# Patient Record
Sex: MALE | Race: WHITE | NOT HISPANIC OR LATINO | Employment: FULL TIME | URBAN - METROPOLITAN AREA
[De-identification: names, ages, dates, MRNs, and addresses within clinical notes are randomized per-mention and may not be internally consistent; named-entity substitution may affect disease eponyms.]

---

## 2017-01-09 ENCOUNTER — TELEPHONE (OUTPATIENT)
Dept: FAMILY MEDICINE | Facility: CLINIC | Age: 32
End: 2017-01-09

## 2017-01-23 ENCOUNTER — TELEPHONE (OUTPATIENT)
Dept: FAMILY MEDICINE | Facility: CLINIC | Age: 32
End: 2017-01-23

## 2017-01-26 ENCOUNTER — OFFICE VISIT (OUTPATIENT)
Dept: FAMILY MEDICINE | Facility: CLINIC | Age: 32
End: 2017-01-26
Payer: COMMERCIAL

## 2017-01-26 ENCOUNTER — DOCUMENTATION ONLY (OUTPATIENT)
Dept: FAMILY MEDICINE | Facility: CLINIC | Age: 32
End: 2017-01-26

## 2017-01-26 VITALS
RESPIRATION RATE: 16 BRPM | HEART RATE: 86 BPM | TEMPERATURE: 98 F | OXYGEN SATURATION: 100 % | DIASTOLIC BLOOD PRESSURE: 75 MMHG | SYSTOLIC BLOOD PRESSURE: 133 MMHG | WEIGHT: 213.88 LBS | BODY MASS INDEX: 29.94 KG/M2 | HEIGHT: 71 IN

## 2017-01-26 DIAGNOSIS — F11.20 NARCOTIC DEPENDENCE: ICD-10-CM

## 2017-01-26 PROCEDURE — 1159F MED LIST DOCD IN RCRD: CPT | Mod: S$GLB,,, | Performed by: INTERNAL MEDICINE

## 2017-01-26 PROCEDURE — 80305 DRUG TEST PRSMV DIR OPT OBS: CPT | Mod: S$GLB,,, | Performed by: INTERNAL MEDICINE

## 2017-01-26 PROCEDURE — 99213 OFFICE O/P EST LOW 20 MIN: CPT | Mod: S$GLB,,, | Performed by: INTERNAL MEDICINE

## 2017-01-26 RX ORDER — BUPRENORPHINE HYDROCHLORIDE AND NALOXONE HYDROCHLORIDE DIHYDRATE 8; 2 MG/1; MG/1
8 TABLET SUBLINGUAL 2 TIMES DAILY
Qty: 60 TABLET | Refills: 0 | Status: SHIPPED | OUTPATIENT
Start: 2017-01-26 | End: 2017-01-26 | Stop reason: SDUPTHER

## 2017-01-26 RX ORDER — BUPRENORPHINE HYDROCHLORIDE AND NALOXONE HYDROCHLORIDE DIHYDRATE 8; 2 MG/1; MG/1
8 TABLET SUBLINGUAL 2 TIMES DAILY
Qty: 60 TABLET | Refills: 0 | Status: SHIPPED | OUTPATIENT
Start: 2017-01-26 | End: 2017-03-07 | Stop reason: SDUPTHER

## 2017-01-26 NOTE — MR AVS SNAPSHOT
[facility]  [address] [address] [address]  Phone: [phone]  Fax: [phone]                  Vargas Lozano   2017 11:20 AM   Office Visit    Description:  Male : 1985   Provider:  Abdulaziz Dolan MD   Department:  [facility]           Reason for Visit     opioid dependence           Diagnoses this Visit        Comments    Narcotic dependence                To Do List           Future Appointments        Provider Department Dept Phone    2017 4:20 PM Abdulaziz Dolan MD [facility] [phone]      Goals (5 Years of Data)     None      Follow-Up and Disposition     Return in about 1 month (around 2017).    Follow-up and Disposition History       These Medications        Disp Refills Start End    buprenorphine-naloxone 8-2 mg (SUBOXONE) 8-2 mg Subl 60 tablet 0 2017     Place 1 tablet under the tongue 2 (two) times daily. May substitue films for tablets. - Sublingual    Pharmacy: [facility] [address] [place] [address] [place], [place] - [address] AT [facility] 43 Ph #: [phone]         [facility] On Call     [facility] Nurse Care Line -  Assistance  Registered nurses in the [facility] provide clinical advisement, health education, appointment booking, and other advisory services.  Call for this free service at [phone].             Medications           Message regarding Medications     Verify the changes and/or additions to your medication regime listed below are the same as discussed with your clinician today.  If any of these changes or additions are incorrect, please notify your healthcare provider.             Verify that the below list of medications is an accurate representation of the medications you are currently taking.  If none reported, the list may be blank. If incorrect, please contact your healthcare provider. Carry this list with you in case  "of emergency.           Current Medications     buprenorphine-naloxone 8-2 mg (SUBOXONE) 8-2 mg Subl Place 1 tablet under the tongue 2 (two) times daily. May substitue films for tablets.    fluoxetine (PROZAC) 20 MG tablet Take 1 tablet (20 mg total) by mouth once daily.    senna (SENOKOT) 8.6 mg tablet Take 1 tablet by mouth once daily.           Clinical Reference Information           Vital Signs - Last Recorded  Most recent update: 1/26/2017 11:36 AM by Susan Greene MA    BP Pulse Temp Resp Ht Wt    133/75 (BP Location: Left arm, Patient Position: Sitting, BP Method: Automatic) 86 98.4 °F (36.9 °C) (Oral) 16 5' 11" (1.803 m) 97 kg (213 lb 13.5 oz)    SpO2 BMI             100% 29.83 kg/m2         Blood Pressure          Most Recent Value    BP  133/75      Allergies as of 1/26/2017     No Known Allergies      Immunizations Administered on Date of Encounter - 1/26/2017     None      Instructions     An order for a urine drug screen with suboxone was given.  The patient is to use the order when called, on a random day.          Smoking Cessation     If you would like to quit smoking:   You may be eligible for free services if you are a [place] resident and started smoking cigarettes before September 1, 1988.  Call the Smoking Cessation Trust ([employer]) toll free at ([phone] or [phone] [phone].   Call 8-059-QUIT-NOW if you do not meet the above criteria.            "

## 2017-01-26 NOTE — PROGRESS NOTES
Health Maintenance Due   Topic Date Due    Lipid Panel  1985    TETANUS VACCINE  11/16/2001

## 2017-01-26 NOTE — PATIENT INSTRUCTIONS
An order for a urine drug screen with suboxone was given.  The patient is to use the order when called, on a random day.

## 2017-01-26 NOTE — PROGRESS NOTES
"Subjective:       Patient ID: Vargas Lozano is a 31 y.o. male.    Chief Complaint: opioid dependence    HPI     The patient presents for medical management of opioid dependency. he is receiving maintenance therapy with buprenorphine.      CHIEF COMPLAINT: opoid dependence  HPI:     ONSET/TIMING:     DURATION: . Continuous(+).    QUALITY/COURSE:  unchanged.     INTENSITY/SEVERITY:  controlled.    CONTEXT/WHEN:     MODIFIERS/TREATMENTS:  Taking medications(+) Suboxone  8/2 # 60,   last filled 12.27.16. . .    SYMPTOMS/RELATED: no withdrawal symptoms. no cravings . No substance abuse.  No alcohol use     pnp checked: last month:  yes    Review of Systems   Constitutional: Negative for fatigue and unexpected weight change.   Cardiovascular: Negative for leg swelling.   Gastrointestinal: Negative for constipation, diarrhea and nausea.   Musculoskeletal: Negative for gait problem.   Neurological: Negative for dizziness, tremors and headaches.   Psychiatric/Behavioral: Negative for behavioral problems, dysphoric mood and sleep disturbance.       Objective:      Vitals:    01/26/17 1133   BP: 133/75   Pulse: 86   Resp: 16   Temp: 98.4 °F (36.9 °C)   TempSrc: Oral   SpO2: 100%   Weight: 97 kg (213 lb 13.5 oz)   Height: 5' 11" (1.803 m)   PainSc: 0-No pain     Physical Exam   Constitutional: He appears well-developed and well-nourished.   Eyes: Pupils are equal, round, and reactive to light.   Cardiovascular: Normal rate, regular rhythm and normal heart sounds.    Pulmonary/Chest: Effort normal and breath sounds normal.   Abdominal: Soft. There is no tenderness.   Neurological: He is alert.   Psychiatric: He has a normal mood and affect. His behavior is normal. Thought content normal.   Nursing note and vitals reviewed.  Urine drug screen negative except buprenorphine.         Assessment:       1. Narcotic dependence          Plan:   (+) pt taking medication as prescribed.    (+) dose is approproate.    (+) urine drug " screen done.   (+) discussed risks of buprenorphine, including to others.     (+) assessed if benefits outweigh risks of buprenorphine. .     (+) reviewed safe storage of medication.     (+) discussed proper use of buprenorphine, including missed doses.    Narcotic dependence  -     buprenorphine-naloxone 8-2 mg (SUBOXONE) 8-2 mg Subl; Place 1 tablet under the tongue 2 (two) times daily. May substitue films for tablets.  Dispense: 60 tablet; Refill: 0    Return in about 1 month (around 2/26/2017).

## 2017-02-01 ENCOUNTER — TELEPHONE (OUTPATIENT)
Dept: FAMILY MEDICINE | Facility: CLINIC | Age: 32
End: 2017-02-01

## 2017-02-01 DIAGNOSIS — F11.20 NARCOTIC DEPENDENCE: Primary | ICD-10-CM

## 2017-02-08 ENCOUNTER — TELEPHONE (OUTPATIENT)
Dept: FAMILY MEDICINE | Facility: CLINIC | Age: 32
End: 2017-02-08

## 2017-02-20 LAB
AMP D-AMPHETAMINE 1000 NG/ML: NEGATIVE
BAR SECOBARBITAL 300 NG/ML: NEGATIVE
BUP BUPRENORPHINE 10 NG/ML: POSITIVE
BZO OXAZEPAM 300 NG/ML: NEGATIVE
COC BENZOYLECGONINE 300 NG/ML: NEGATIVE
CTP QC/QA: YES
MET D-METHAMPHETAMINE 500 NG/ML: NEGATIVE
MOP MORPHINE 300 NG/ML: NEGATIVE
MTD METHADONE 300 NG/ML: NEGATIVE
QXY OXYCODONE 100 NG/ML: NEGATIVE
THC 11-NOR-9-TETRAHYDROCANNABINOL-9-CARBOXYLIC ACID: NEGATIVE

## 2017-02-22 ENCOUNTER — TELEPHONE (OUTPATIENT)
Dept: FAMILY MEDICINE | Facility: CLINIC | Age: 32
End: 2017-02-22

## 2017-02-23 ENCOUNTER — DOCUMENTATION ONLY (OUTPATIENT)
Dept: FAMILY MEDICINE | Facility: CLINIC | Age: 32
End: 2017-02-23

## 2017-02-23 NOTE — PROGRESS NOTES
Health Maintenance Due   Topic Date Due    Lipid Panel  1985    TETANUS VACCINE  11/16/2001    Pneumococcal PPSV23 (Medium Risk) (1) 11/16/2001    Influenza Vaccine  08/01/2016

## 2017-03-07 ENCOUNTER — OFFICE VISIT (OUTPATIENT)
Dept: FAMILY MEDICINE | Facility: CLINIC | Age: 32
End: 2017-03-07
Payer: COMMERCIAL

## 2017-03-07 ENCOUNTER — CLINICAL SUPPORT (OUTPATIENT)
Dept: FAMILY MEDICINE | Facility: CLINIC | Age: 32
End: 2017-03-07
Payer: COMMERCIAL

## 2017-03-07 ENCOUNTER — DOCUMENTATION ONLY (OUTPATIENT)
Dept: FAMILY MEDICINE | Facility: CLINIC | Age: 32
End: 2017-03-07

## 2017-03-07 ENCOUNTER — TELEPHONE (OUTPATIENT)
Dept: FAMILY MEDICINE | Facility: CLINIC | Age: 32
End: 2017-03-07

## 2017-03-07 VITALS
BODY MASS INDEX: 30.25 KG/M2 | SYSTOLIC BLOOD PRESSURE: 130 MMHG | TEMPERATURE: 98 F | HEIGHT: 71 IN | HEART RATE: 78 BPM | OXYGEN SATURATION: 97 % | WEIGHT: 216.06 LBS | DIASTOLIC BLOOD PRESSURE: 83 MMHG | RESPIRATION RATE: 16 BRPM

## 2017-03-07 DIAGNOSIS — F11.20 OPIOID TYPE DEPENDENCE, UNSPECIFIED: Primary | ICD-10-CM

## 2017-03-07 DIAGNOSIS — F11.20 NARCOTIC DEPENDENCE: ICD-10-CM

## 2017-03-07 PROCEDURE — 80305 DRUG TEST PRSMV DIR OPT OBS: CPT | Mod: S$GLB,,, | Performed by: INTERNAL MEDICINE

## 2017-03-07 PROCEDURE — 1160F RVW MEDS BY RX/DR IN RCRD: CPT | Mod: S$GLB,,, | Performed by: INTERNAL MEDICINE

## 2017-03-07 PROCEDURE — 99213 OFFICE O/P EST LOW 20 MIN: CPT | Mod: S$GLB,,, | Performed by: INTERNAL MEDICINE

## 2017-03-07 RX ORDER — BUPRENORPHINE HYDROCHLORIDE AND NALOXONE HYDROCHLORIDE DIHYDRATE 8; 2 MG/1; MG/1
8 TABLET SUBLINGUAL 2 TIMES DAILY
Qty: 60 TABLET | Refills: 0 | Status: SHIPPED | OUTPATIENT
Start: 2017-03-07 | End: 2017-04-20 | Stop reason: SDUPTHER

## 2017-03-07 NOTE — MR AVS SNAPSHOT
[facility]  [address] [address] [address]  Phone: [phone]  Fax: [phone]                  Vargas Lozano   3/7/2017 10:00 AM   Office Visit    Description:  Male : 1985   Provider:  Abdulaziz Dolan MD   Department:  [facility]           Reason for Visit     opioid dependence           Diagnoses this Visit        Comments    Narcotic dependence                To Do List           Future Appointments        Provider Department Dept Phone    2017 10:40 AM Abdulaziz Dolan MD [facility] [phone]      Goals (5 Years of Data)     None      Follow-Up and Disposition     Return in about 1 month (around 2017).    Follow-up and Disposition History       These Medications        Disp Refills Start End    buprenorphine-naloxone 8-2 mg (SUBOXONE) 8-2 mg Subl 60 tablet 0 3/7/2017     Place 1 tablet under the tongue 2 (two) times daily. May substitue films for tablets. - Sublingual    [facility]acy: [facility] [address] [place] [address] [place], [place] - [address] AT [facility] 43 Ph #: [phone]         [facility] On Call     [facility] Nurse Care Line -  Assistance  Registered nurses in the [facility] provide clinical advisement, health education, appointment booking, and other advisory services.  Call for this free service at [phone].             Medications           Message regarding Medications     Verify the changes and/or additions to your medication regime listed below are the same as discussed with your clinician today.  If any of these changes or additions are incorrect, please notify your healthcare provider.             Verify that the below list of medications is an accurate representation of the medications you are currently taking.  If none reported, the list may be blank. If incorrect, please contact your healthcare provider. Carry this list with you in case of  "emergency.           Current Medications     buprenorphine-naloxone 8-2 mg (SUBOXONE) 8-2 mg Subl Place 1 tablet under the tongue 2 (two) times daily. May substitue films for tablets.    senna (SENOKOT) 8.6 mg tablet Take 1 tablet by mouth once daily.    fluoxetine (PROZAC) 20 MG tablet Take 1 tablet (20 mg total) by mouth once daily.           Clinical Reference Information           Your Vitals Were     BP Pulse Temp Resp Height Weight    130/83 (BP Location: Right arm, Patient Position: Sitting, BP Method: Automatic) 78 98.2 °F (36.8 °C) (Oral) 16 5' 11" (1.803 m) 98 kg (216 lb 0.8 oz)    SpO2 BMI             97% 30.13 kg/m2         Blood Pressure          Most Recent Value    BP  130/83      Allergies as of 3/7/2017     No Known Allergies      Immunizations Administered on Date of Encounter - 3/7/2017     None      Instructions     An order for a urine drug screen with suboxone was given.  The patient is to use the order when called, on a random day.          Smoking Cessation     If you would like to quit smoking:   You may be eligible for free services if you are a [place] resident and started smoking cigarettes before September 1, 1988.  Call the Smoking Cessation Trust ([facility]) toll free at [phone] or [phone].   Call 0-800-QUIT-NOW if you do not meet the above criteria.            Language Assistance Services     ATTENTION: Language assistance services are available, free of charge. Please call [phone].      ATENCIÓN: Si habla español, tiene a somers disposición servicios gratuitos de asistencia lingüística. Llame al [phone].     [facility] Ý: N?u b?n nói Ti?ng Vi?t, có các d?ch v? h? tr? ngôn ng? mi?n phí dành cho b?n. G?i s? [phone].         [facility] complies with applicable Federal civil rights laws and does not discriminate on the basis of race, color, national origin, age, disability, or sex.        "

## 2017-03-07 NOTE — TELEPHONE ENCOUNTER
----- Message from Apryl Gaytan sent at 3/7/2017  8:24 AM CST -----  Contact: Patient  Patient called to schedule appointment for suboxone . Please call back at [phone] to confirm thanks,

## 2017-03-07 NOTE — PROGRESS NOTES
"Subjective:       Patient ID: Vargas Lozano is a 31 y.o. male.    Chief Complaint: opioid dependence    HPI   The patient presents for medical management of opioid dependency. he is receiving maintenance therapy with buprenorphine.      CHIEF COMPLAINT: opoid dependence  HPI:     ONSET/TIMING:     DURATION: . Continuous(+).    QUALITY/COURSE:  unchanged.     INTENSITY/SEVERITY:  controlled.    CONTEXT/WHEN:     MODIFIERS/TREATMENTS:  Taking medications(+) Suboxone  8/2 # 60,   last filled 1.26.17. . .    SYMPTOMS/RELATED: no withdrawal symptoms. no cravings . No substance abuse.  No alcohol use     pnp checked: last month:  yes    Review of Systems   Constitutional: Negative for fatigue and unexpected weight change.   Cardiovascular: Negative for leg swelling.   Gastrointestinal: Negative for constipation, diarrhea and nausea.   Musculoskeletal: Negative for gait problem.   Neurological: Negative for dizziness, tremors and headaches.   Psychiatric/Behavioral: Negative for behavioral problems, dysphoric mood and sleep disturbance.       Objective:      Vitals:    03/07/17 0955   BP: 130/83   Pulse: 78   Resp: 16   Temp: 98.2 °F (36.8 °C)   TempSrc: Oral   SpO2: 97%   Weight: 98 kg (216 lb 0.8 oz)   Height: 5' 11" (1.803 m)   PainSc: 0-No pain     Physical Exam   Constitutional: He appears well-developed and well-nourished.   Eyes: Pupils are equal, round, and reactive to light.   Cardiovascular: Normal rate, regular rhythm and normal heart sounds.    Pulmonary/Chest: Effort normal and breath sounds normal.   Abdominal: Soft. There is no tenderness.   Neurological: He is alert.   Psychiatric: He has a normal mood and affect. His behavior is normal. Thought content normal.   Nursing note and vitals reviewed.  Urine drug screen negative except buprenorphine.         Assessment:       1. Narcotic dependence          Plan:   (+) pt taking medication as prescribed.    (+) dose is approproate.    (+) urine drug " screen done.   (+) discussed risks of buprenorphine, including to others.     (+) assessed if benefits outweigh risks of buprenorphine. .     (+) reviewed safe storage of medication.     (+) discussed proper use of buprenorphine, including missed doses.    Narcotic dependence  -     buprenorphine-naloxone 8-2 mg (SUBOXONE) 8-2 mg Subl; Place 1 tablet under the tongue 2 (two) times daily. May substitue films for tablets.  Dispense: 60 tablet; Refill: 0    No Follow-up on file.

## 2017-04-10 ENCOUNTER — TELEPHONE (OUTPATIENT)
Dept: FAMILY MEDICINE | Facility: CLINIC | Age: 32
End: 2017-04-10

## 2017-04-20 ENCOUNTER — DOCUMENTATION ONLY (OUTPATIENT)
Dept: FAMILY MEDICINE | Facility: CLINIC | Age: 32
End: 2017-04-20

## 2017-04-20 ENCOUNTER — OFFICE VISIT (OUTPATIENT)
Dept: FAMILY MEDICINE | Facility: CLINIC | Age: 32
End: 2017-04-20
Payer: COMMERCIAL

## 2017-04-20 VITALS
WEIGHT: 215.63 LBS | BODY MASS INDEX: 30.19 KG/M2 | HEIGHT: 71 IN | DIASTOLIC BLOOD PRESSURE: 86 MMHG | RESPIRATION RATE: 16 BRPM | SYSTOLIC BLOOD PRESSURE: 126 MMHG | OXYGEN SATURATION: 99 % | TEMPERATURE: 98 F | HEART RATE: 76 BPM

## 2017-04-20 DIAGNOSIS — R61 NIGHT SWEATS: ICD-10-CM

## 2017-04-20 DIAGNOSIS — F11.20 NARCOTIC DEPENDENCE: Primary | ICD-10-CM

## 2017-04-20 PROCEDURE — 1160F RVW MEDS BY RX/DR IN RCRD: CPT | Mod: S$GLB,,, | Performed by: INTERNAL MEDICINE

## 2017-04-20 PROCEDURE — 99213 OFFICE O/P EST LOW 20 MIN: CPT | Mod: S$GLB,,, | Performed by: INTERNAL MEDICINE

## 2017-04-20 RX ORDER — BUPRENORPHINE HYDROCHLORIDE AND NALOXONE HYDROCHLORIDE DIHYDRATE 8; 2 MG/1; MG/1
8 TABLET SUBLINGUAL 2 TIMES DAILY
Qty: 60 TABLET | Refills: 0 | Status: SHIPPED | OUTPATIENT
Start: 2017-04-20 | End: 2017-05-24 | Stop reason: SDUPTHER

## 2017-04-20 NOTE — PROGRESS NOTES
Health Maintenance Due   Topic Date Due    Lipid Panel  1985    Pneumococcal PPSV23 (Medium Risk) (1) 11/16/2001    Influenza Vaccine  08/01/2016

## 2017-04-20 NOTE — MR AVS SNAPSHOT
[facility]  [address] [address] [address]  Phone: [phone]  Fax: [phone]                  Vargas Lozano   2017 10:20 AM   Office Visit    Description:  Male : 1985   Provider:  Abdulaziz Dolan MD   Department:  [facility]           Reason for Visit     opioid dependence           Diagnoses this Visit        Comments    Narcotic dependence    -  Primary     Night sweats                To Do List           Future Appointments        Provider Department Dept Phone    2017 9:00 AM [facility] XR1 [facility]- X-Ray [phone]    2017 11:20 AM Abdulaziz Dolan MD [facility] [phone]      Goals (5 Years of Data)     None      Follow-Up and Disposition     Return in about 1 month (around 2017).    Follow-up and Disposition History       These Medications        Disp Refills Start End    buprenorphine-naloxone 8-2 mg (SUBOXONE) 8-2 mg Subl 60 tablet 0 2017     Place 1 tablet under the tongue 2 (two) times daily. May substitue films for tablets. - Sublingual    Pharmacy: [facility] [address] [place], [place] [address] AT [facility] 43 Ph #: [phone]         [facility]     [facility] - 24 Assistance  Unless otherwise directed by your provider, please contact [facility], our nurse care line that is available for  assistance.     Registered nurses in the [facility] provide: appointment scheduling, clinical advisement, health education, and other advisory services.  Call: [phone] (toll free)               Medications           Message regarding Medications     Verify the changes and/or additions to your medication regime listed below are the same as discussed with your clinician today.  If any of these changes or additions are incorrect, please notify your healthcare provider.             Verify that the  "below list of medications is an accurate representation of the medications you are currently taking.  If none reported, the list may be blank. If incorrect, please contact your healthcare provider. Carry this list with you in case of emergency.           Current Medications     buprenorphine-naloxone 8-2 mg (SUBOXONE) 8-2 mg Subl Place 1 tablet under the tongue 2 (two) times daily. May substitue films for tablets.    fluoxetine (PROZAC) 20 MG tablet Take 1 tablet (20 mg total) by mouth once daily.    senna (SENOKOT) 8.6 mg tablet Take 1 tablet by mouth once daily.           Clinical Reference Information           Your Vitals Were     BP Pulse Temp Resp Height Weight    126/86 (BP Location: Left arm, Patient Position: Sitting, BP Method: Automatic) 76 98 °F (36.7 °C) (Oral) 16 5' 11" (1.803 m) 97.8 kg (215 lb 9.8 oz)    SpO2 BMI             99% 30.07 kg/m2         Blood Pressure          Most Recent Value    BP  126/86      Allergies as of 4/20/2017     No Known Allergies      Immunizations Administered on Date of Encounter - 4/20/2017     None      Orders Placed During Today's Visit     Future Labs/Procedures Expected by Expires    X-Ray Chest PA And Lateral  4/20/2017 7/19/2017      Instructions     An order for a urine drug screen with suboxone was given.  The patient is to use the order when called, on a random day.          Smoking Cessation     If you would like to quit smoking:   You may be eligible for free services if you are a [place] resident and started smoking cigarettes before September 1, 1988.  Call the Smoking Cessation Trust ([facility]) toll free at ([phone] or [phone] [phone].   Call 2-800-QUIT-NOW if you do not meet the above criteria.   Contact us via email: [email or web address]   View our website for more information: [email or web address]        Language Assistance Services     ATTENTION: Language assistance services are available, free of charge. Please call [phone].  "     ATENCIÓN: Si habla español, tiene a somers disposición servicios gratuitos de asistencia lingüística. Remi al [phone].     CHÚ Ý: N?u b?n nói Ti?ng Vi?t, có các d?ch v? h? tr? ngôn ng? mi?n phí dành cho b?n. G?i s? [phone].         [facility] complies with applicable Federal civil rights laws and does not discriminate on the basis of race, color, national origin, age, disability, or sex.

## 2017-04-20 NOTE — PROGRESS NOTES
"Subjective:       Patient ID: Vargas Lozano is a 31 y.o. male.    Chief Complaint: opioid dependence    HPI   The patient presents for medical management of opioid dependency. he is receiving maintenance therapy with buprenorphine.      CHIEF COMPLAINT: opoid dependence  HPI:     ONSET/TIMING:     DURATION: . Continuous(+).    QUALITY/COURSE:  unchanged.     INTENSITY/SEVERITY:  controlled.    CONTEXT/WHEN:     MODIFIERS/TREATMENTS:  Taking medications(+) Suboxone  8/2 # 60,   last filled 3.7.17. . .    SYMPTOMS/RELATED: no withdrawal symptoms. no cravings . No substance abuse.  No alcohol use     pnp checked: last month:  Yes    Hx positive tb test.  Was treated with INH,  But having night sweats.     Review of Systems   Constitutional: Positive for diaphoresis. Negative for fatigue and unexpected weight change.   Cardiovascular: Negative for leg swelling.   Gastrointestinal: Negative for constipation, diarrhea and nausea.   Musculoskeletal: Negative for gait problem.   Neurological: Negative for dizziness, tremors and headaches.   Psychiatric/Behavioral: Negative for behavioral problems, dysphoric mood and sleep disturbance.       Objective:      Vitals:    04/20/17 1024   BP: 126/86   Pulse: 76   Resp: 16   Temp: 98 °F (36.7 °C)   TempSrc: Oral   SpO2: 99%   Weight: 97.8 kg (215 lb 9.8 oz)   Height: 5' 11" (1.803 m)   PainSc: 0-No pain     Physical Exam   Constitutional: He appears well-developed and well-nourished.   Eyes: Pupils are equal, round, and reactive to light.   Cardiovascular: Normal rate, regular rhythm and normal heart sounds.    Pulmonary/Chest: Effort normal and breath sounds normal.   Abdominal: Soft. There is no tenderness.   Neurological: He is alert.   Psychiatric: He has a normal mood and affect. His behavior is normal. Thought content normal.   Nursing note and vitals reviewed.      Urine drug screen negative except buprenorphine.    Assessment:       1. Narcotic dependence    2. " Night sweats          Plan:   (+) pt taking medication as prescribed.    (+) dose is approproate.    (+) urine drug screen done.   (+) discussed risks of buprenorphine, including to others.     (+) assessed if benefits outweigh risks of buprenorphine. .     (+) reviewed safe storage of medication.     (+) discussed proper use of buprenorphine, including missed doses.    Narcotic dependence  -     buprenorphine-naloxone 8-2 mg (SUBOXONE) 8-2 mg Subl; Place 1 tablet under the tongue 2 (two) times daily. May substitue films for tablets.  Dispense: 60 tablet; Refill: 0    Night sweats  -     X-Ray Chest PA And Lateral; Future; Expected date: 4/20/17      Return in about 1 month (around 5/20/2017).

## 2017-04-26 ENCOUNTER — TELEPHONE (OUTPATIENT)
Dept: FAMILY MEDICINE | Facility: CLINIC | Age: 32
End: 2017-04-26

## 2017-05-18 ENCOUNTER — DOCUMENTATION ONLY (OUTPATIENT)
Dept: FAMILY MEDICINE | Facility: CLINIC | Age: 32
End: 2017-05-18

## 2017-05-18 NOTE — PROGRESS NOTES
Health Maintenance Due   Topic Date Due    Lipid Panel  1985    Pneumococcal PPSV23 (Medium Risk) (1) 11/16/2001

## 2017-05-22 ENCOUNTER — TELEPHONE (OUTPATIENT)
Dept: FAMILY MEDICINE | Facility: CLINIC | Age: 32
End: 2017-05-22

## 2017-05-24 ENCOUNTER — CLINICAL SUPPORT (OUTPATIENT)
Dept: FAMILY MEDICINE | Facility: CLINIC | Age: 32
End: 2017-05-24
Payer: COMMERCIAL

## 2017-05-24 ENCOUNTER — DOCUMENTATION ONLY (OUTPATIENT)
Dept: FAMILY MEDICINE | Facility: CLINIC | Age: 32
End: 2017-05-24

## 2017-05-24 ENCOUNTER — OFFICE VISIT (OUTPATIENT)
Dept: FAMILY MEDICINE | Facility: CLINIC | Age: 32
End: 2017-05-24
Payer: COMMERCIAL

## 2017-05-24 VITALS
BODY MASS INDEX: 29.85 KG/M2 | WEIGHT: 213.19 LBS | HEIGHT: 71 IN | HEART RATE: 85 BPM | DIASTOLIC BLOOD PRESSURE: 89 MMHG | SYSTOLIC BLOOD PRESSURE: 132 MMHG | TEMPERATURE: 98 F | RESPIRATION RATE: 16 BRPM | OXYGEN SATURATION: 97 %

## 2017-05-24 DIAGNOSIS — F10.10 ALCOHOL ABUSE: Primary | ICD-10-CM

## 2017-05-24 DIAGNOSIS — B35.4 TINEA CORPORIS: ICD-10-CM

## 2017-05-24 DIAGNOSIS — F11.20 NARCOTIC DEPENDENCE: ICD-10-CM

## 2017-05-24 DIAGNOSIS — F11.20 OPIOID TYPE DEPENDENCE, CONTINUOUS: Primary | ICD-10-CM

## 2017-05-24 PROCEDURE — 99213 OFFICE O/P EST LOW 20 MIN: CPT | Mod: S$GLB,,, | Performed by: INTERNAL MEDICINE

## 2017-05-24 PROCEDURE — 1160F RVW MEDS BY RX/DR IN RCRD: CPT | Mod: S$GLB,,, | Performed by: INTERNAL MEDICINE

## 2017-05-24 PROCEDURE — 80305 DRUG TEST PRSMV DIR OPT OBS: CPT | Mod: QW,S$GLB,, | Performed by: INTERNAL MEDICINE

## 2017-05-24 RX ORDER — BUPRENORPHINE HYDROCHLORIDE AND NALOXONE HYDROCHLORIDE DIHYDRATE 8; 2 MG/1; MG/1
1 TABLET SUBLINGUAL 2 TIMES DAILY
Qty: 60 TABLET | Refills: 0 | Status: SHIPPED | OUTPATIENT
Start: 2017-05-24 | End: 2017-08-07 | Stop reason: SDUPTHER

## 2017-05-24 RX ORDER — FLUCONAZOLE 100 MG/1
100 TABLET ORAL DAILY
Qty: 30 TABLET | Refills: 0 | Status: SHIPPED | OUTPATIENT
Start: 2017-05-24 | End: 2017-06-23

## 2017-05-24 NOTE — PROGRESS NOTES
"Subjective:       Patient ID: Vargas Lozano is a 31 y.o. male.    Chief Complaint: opioid dependence    HPI   The patient presents for medical management of opioid dependency. he is receiving maintenance therapy with buprenorphine.      CHIEF COMPLAINT: opoid dependence  HPI:     ONSET/TIMING:     DURATION: . Continuous(+).    QUALITY/COURSE:  unchanged.     INTENSITY/SEVERITY:  controlled.    CONTEXT/WHEN:     MODIFIERS/TREATMENTS:  Taking medications(+) Suboxone  8/2 # 60,   last filled 4.20.17. . .    SYMPTOMS/RELATED: no withdrawal symptoms. no cravings . No substance abuse.   alcohol use    The patient is drinking between 4 and 6 beers a day.  Sometimes when the weekends.  His wife is here is concerned about his alcohol use.  He currently drives  after drinking.     pnp checked: last month:  Yes    Patient has a slightly itchy rash on his chest and back.  Worse in the sun.  For 6 months    Review of Systems   Constitutional: Negative for fatigue and unexpected weight change.   Cardiovascular: Negative for leg swelling.   Gastrointestinal: Negative for constipation, diarrhea and nausea.   Musculoskeletal: Negative for gait problem.   Neurological: Positive for headaches. Negative for dizziness and tremors.   Psychiatric/Behavioral: Negative for behavioral problems, dysphoric mood and sleep disturbance.       Objective:      Vitals:    05/24/17 1044   BP: 132/89   Pulse: 85   Resp: 16   Temp: 98.3 °F (36.8 °C)   TempSrc: Oral   SpO2: 97%   Weight: 96.7 kg (213 lb 3 oz)   Height: 5' 11" (1.803 m)   PainSc: 0-No pain     Physical Exam   Constitutional: He appears well-developed and well-nourished.   Eyes: Pupils are equal, round, and reactive to light.   Cardiovascular: Normal rate, regular rhythm and normal heart sounds.    Pulmonary/Chest: Effort normal and breath sounds normal.   Abdominal: Soft. There is no tenderness.   Neurological: He is alert.   Skin:   Macular rash largest lesion being 12 x 5 cm on " his back.  All with accentuated borders   Psychiatric: He has a normal mood and affect. His behavior is normal. Thought content normal.   Nursing note and vitals reviewed.      Urine drug screen negative except buprenorphine.     Assessment:       1. Alcohol abuse    2. Narcotic dependence    3. Tinea corporis          Plan:     Alcohol abuse  -     Comprehensive metabolic panel; Future; Expected date: 05/24/2017    Narcotic dependence  -     buprenorphine-naloxone 8-2 mg (SUBOXONE) 8-2 mg Subl; Place 1 tablet under the tongue 2 (two) times daily. May substitue films for tablets.  Dispense: 60 tablet; Refill: 0    Tinea corporis  -     fluconazole (DIFLUCAN) 100 MG tablet; Take 1 tablet (100 mg total) by mouth once daily.  Dispense: 30 tablet; Refill: 0      Return in about 1 month (around 6/24/2017).

## 2017-05-24 NOTE — PATIENT INSTRUCTIONS
Try to cut alcohol back to normal more than 2 drinks a day.    Suggest going incognito to AA meetings even if he doesn't say a word.

## 2017-06-01 DIAGNOSIS — F11.20 NARCOTIC DEPENDENCE: ICD-10-CM

## 2017-06-02 RX ORDER — FLUOXETINE 20 MG/1
20 TABLET ORAL DAILY
Qty: 90 TABLET | Refills: 1 | Status: SHIPPED | OUTPATIENT
Start: 2017-06-02 | End: 2018-07-19

## 2017-06-12 ENCOUNTER — TELEPHONE (OUTPATIENT)
Dept: FAMILY MEDICINE | Facility: CLINIC | Age: 32
End: 2017-06-12

## 2017-06-20 ENCOUNTER — DOCUMENTATION ONLY (OUTPATIENT)
Dept: FAMILY MEDICINE | Facility: CLINIC | Age: 32
End: 2017-06-20

## 2017-06-20 ENCOUNTER — TELEPHONE (OUTPATIENT)
Dept: FAMILY MEDICINE | Facility: CLINIC | Age: 32
End: 2017-06-20

## 2017-06-27 ENCOUNTER — TELEPHONE (OUTPATIENT)
Dept: FAMILY MEDICINE | Facility: CLINIC | Age: 32
End: 2017-06-27

## 2017-06-30 ENCOUNTER — TELEPHONE (OUTPATIENT)
Dept: FAMILY MEDICINE | Facility: CLINIC | Age: 32
End: 2017-06-30

## 2017-06-30 NOTE — TELEPHONE ENCOUNTER
----- Message from Nusrat Figueroa sent at 6/30/2017 11:12 AM CDT -----  Contact: pt  Pt would like to come in on 7/2 to see the Dr ,please for the appointment that he missed....[phone] (home)

## 2017-07-03 ENCOUNTER — OFFICE VISIT (OUTPATIENT)
Dept: FAMILY MEDICINE | Facility: CLINIC | Age: 32
End: 2017-07-03
Payer: COMMERCIAL

## 2017-07-03 ENCOUNTER — TELEPHONE (OUTPATIENT)
Dept: FAMILY MEDICINE | Facility: CLINIC | Age: 32
End: 2017-07-03

## 2017-07-03 ENCOUNTER — DOCUMENTATION ONLY (OUTPATIENT)
Dept: FAMILY MEDICINE | Facility: CLINIC | Age: 32
End: 2017-07-03

## 2017-07-03 VITALS
HEART RATE: 89 BPM | OXYGEN SATURATION: 97 % | BODY MASS INDEX: 29.97 KG/M2 | TEMPERATURE: 98 F | HEIGHT: 71 IN | RESPIRATION RATE: 16 BRPM | DIASTOLIC BLOOD PRESSURE: 78 MMHG | SYSTOLIC BLOOD PRESSURE: 125 MMHG | WEIGHT: 214.06 LBS

## 2017-07-03 DIAGNOSIS — F11.20 NARCOTIC DEPENDENCE: Primary | ICD-10-CM

## 2017-07-03 PROCEDURE — 99213 OFFICE O/P EST LOW 20 MIN: CPT | Mod: S$GLB,,, | Performed by: INTERNAL MEDICINE

## 2017-07-03 RX ORDER — BUPRENORPHINE AND NALOXONE 8; 2 MG/1; MG/1
1 FILM, SOLUBLE BUCCAL; SUBLINGUAL 2 TIMES DAILY
Qty: 40 PACKET | Refills: 0 | Status: SHIPPED | OUTPATIENT
Start: 2017-07-03 | End: 2017-07-23

## 2017-07-03 NOTE — PROGRESS NOTES
"Subjective:       Patient ID: Vargas Lozano is a 31 y.o. male.    Chief Complaint: opioid dependence    HPI   The patient presents for medical management of opioid dependency. he is receiving maintenance therapy with buprenorphine.      CHIEF COMPLAINT: opoid dependence  HPI:     ONSET/TIMING:     DURATION: . Continuous(+).    QUALITY/COURSE:  unchanged.     INTENSITY/SEVERITY:  controlled.    CONTEXT/WHEN:     MODIFIERS/TREATMENTS:  Taking medications(+) Suboxone  8/2 # 60,   last filled 5/25/17. . .    SYMPTOMS/RELATED: no withdrawal symptoms. no cravings . No substance abuse.  No alcohol use     pnp checked: last month:  yes    Review of Systems   Constitutional: Negative for fatigue and unexpected weight change.   Cardiovascular: Negative for leg swelling.   Gastrointestinal: Negative for constipation, diarrhea and nausea.   Musculoskeletal: Negative for gait problem.   Neurological: Negative for dizziness, tremors and headaches.   Psychiatric/Behavioral: Negative for behavioral problems, dysphoric mood and sleep disturbance.       Objective:      Vitals:    07/03/17 1017   BP: 125/78   Pulse: 89   Resp: 16   Temp: 98.4 °F (36.9 °C)   TempSrc: Oral   SpO2: 97%   Weight: 97.1 kg (214 lb 1.1 oz)   Height: 5' 11" (1.803 m)   PainSc: 0-No pain     Physical Exam   Constitutional: He appears well-developed and well-nourished.   Eyes: Pupils are equal, round, and reactive to light.   Cardiovascular: Normal rate, regular rhythm and normal heart sounds.    Pulmonary/Chest: Effort normal and breath sounds normal.   Abdominal: Soft. There is no tenderness.   Neurological: He is alert.   Psychiatric: He has a normal mood and affect. His behavior is normal. Thought content normal.   Nursing note and vitals reviewed.  no drug screen      Assessment:       1. Narcotic dependence          Plan:    (+) pt taking medication as prescribed.    (+) dose is approproate.    (-) urine drug screen done. Will give pt 2/3 of rx for " suboxone.     (+) discussed risks of buprenorphine, including to others.     (+) assessed if benefits outweigh risks of buprenorphine. .     (+) reviewed safe storage of medication.     (+) discussed proper use of buprenorphine, including missed doses.    Narcotic dependence  -     buprenorphine-naloxone (SUBOXONE) 8-2 mg Film; Place 1 packet (1 each total) under the tongue 2 (two) times daily.  Dispense: 40 packet; Refill: 0  -     Miscellaneous Sendout Test Urine; Future; Expected date: 07/03/2017  -     Miscellaneous Sendout Test Urine; Future; Expected date: 07/03/2017      Return in about 1 month (around 8/3/2017).

## 2017-07-25 ENCOUNTER — TELEPHONE (OUTPATIENT)
Dept: FAMILY MEDICINE | Facility: CLINIC | Age: 32
End: 2017-07-25

## 2017-07-25 NOTE — TELEPHONE ENCOUNTER
----- Message from Maggy Medel sent at 7/25/2017 12:23 PM CDT -----  Contact: self [phone]  He is calling to find out when he is supposed to have his drug screen performed?  Please call him.  Thank you!

## 2017-07-28 ENCOUNTER — TELEPHONE (OUTPATIENT)
Dept: FAMILY MEDICINE | Facility: CLINIC | Age: 32
End: 2017-07-28

## 2017-07-28 ENCOUNTER — DOCUMENTATION ONLY (OUTPATIENT)
Dept: FAMILY MEDICINE | Facility: CLINIC | Age: 32
End: 2017-07-28

## 2017-07-28 DIAGNOSIS — F11.20 NARCOTIC DEPENDENCE: Primary | ICD-10-CM

## 2017-07-28 RX ORDER — BUPRENORPHINE AND NALOXONE 8; 2 MG/1; MG/1
1 FILM, SOLUBLE BUCCAL; SUBLINGUAL 2 TIMES DAILY
Qty: 20 PACKET | Refills: 0 | Status: SHIPPED | OUTPATIENT
Start: 2017-07-28 | End: 2017-08-07 | Stop reason: SDUPTHER

## 2017-07-31 ENCOUNTER — TELEPHONE (OUTPATIENT)
Dept: FAMILY MEDICINE | Facility: CLINIC | Age: 32
End: 2017-07-31

## 2017-08-02 ENCOUNTER — TELEPHONE (OUTPATIENT)
Dept: FAMILY MEDICINE | Facility: CLINIC | Age: 32
End: 2017-08-02

## 2017-08-07 ENCOUNTER — OFFICE VISIT (OUTPATIENT)
Dept: FAMILY MEDICINE | Facility: CLINIC | Age: 32
End: 2017-08-07
Payer: COMMERCIAL

## 2017-08-07 ENCOUNTER — DOCUMENTATION ONLY (OUTPATIENT)
Dept: FAMILY MEDICINE | Facility: CLINIC | Age: 32
End: 2017-08-07

## 2017-08-07 VITALS
SYSTOLIC BLOOD PRESSURE: 129 MMHG | WEIGHT: 217.81 LBS | HEART RATE: 73 BPM | HEIGHT: 71 IN | DIASTOLIC BLOOD PRESSURE: 78 MMHG | BODY MASS INDEX: 30.49 KG/M2 | TEMPERATURE: 98 F | RESPIRATION RATE: 16 BRPM | OXYGEN SATURATION: 98 %

## 2017-08-07 DIAGNOSIS — F11.20 NARCOTIC DEPENDENCE: Primary | ICD-10-CM

## 2017-08-07 PROCEDURE — 99213 OFFICE O/P EST LOW 20 MIN: CPT | Mod: S$GLB,,, | Performed by: INTERNAL MEDICINE

## 2017-08-07 PROCEDURE — 3008F BODY MASS INDEX DOCD: CPT | Mod: S$GLB,,, | Performed by: INTERNAL MEDICINE

## 2017-08-07 RX ORDER — BUPRENORPHINE HYDROCHLORIDE AND NALOXONE HYDROCHLORIDE DIHYDRATE 8; 2 MG/1; MG/1
1 TABLET SUBLINGUAL 2 TIMES DAILY
Qty: 60 TABLET | Refills: 0 | Status: SHIPPED | OUTPATIENT
Start: 2017-08-07 | End: 2017-08-07

## 2017-08-07 RX ORDER — BUPRENORPHINE AND NALOXONE 8; 2 MG/1; MG/1
FILM, SOLUBLE BUCCAL; SUBLINGUAL
Qty: 45 PACKET | Refills: 0 | Status: SHIPPED | OUTPATIENT
Start: 2017-08-07 | End: 2017-09-13 | Stop reason: SDUPTHER

## 2017-08-07 NOTE — PROGRESS NOTES
Health Maintenance Due   Topic Date Due    Lipid Panel  1983    Pneumococcal PPSV23 (Medium Risk) (1) 11/16/2001    Influenza Vaccine  08/01/2017

## 2017-08-07 NOTE — PROGRESS NOTES
"Subjective:       Patient ID: Vargas Lozano is a 31 y.o. male.    Chief Complaint: opioid dependence    HPI   The patient presents for medical management of opioid dependency. he is receiving maintenance therapy with buprenorphine.      CHIEF COMPLAINT: opoid dependence  HPI:     ONSET/TIMING:     DURATION: . Continuous(+).    QUALITY/COURSE:  unchanged.     INTENSITY/SEVERITY:  controlled.    CONTEXT/WHEN:     MODIFIERS/TREATMENTS:  Taking medications(+) Suboxone  8/2 # 60,   last filled 7.3.17. . .    SYMPTOMS/RELATED: no withdrawal symptoms. no cravings . No substance abuse.  No alcohol use     pnp checked: last month:  yes    Review of Systems   Constitutional: Negative for fatigue and unexpected weight change.   Cardiovascular: Negative for leg swelling.   Gastrointestinal: Negative for constipation, diarrhea and nausea.   Musculoskeletal: Negative for gait problem.   Neurological: Negative for dizziness, tremors and headaches.   Psychiatric/Behavioral: Negative for behavioral problems, dysphoric mood and sleep disturbance.       Objective:      Vitals:    08/07/17 1124   BP: 129/78   Pulse: 73   Resp: 16   Temp: 97.9 °F (36.6 °C)   TempSrc: Oral   SpO2: 98%   Weight: 98.8 kg (217 lb 13 oz)   Height: 5' 11" (1.803 m)   PainSc: 0-No pain     Physical Exam   Constitutional: He appears well-developed and well-nourished.   Eyes: Pupils are equal, round, and reactive to light.   Cardiovascular: Normal rate, regular rhythm and normal heart sounds.    Pulmonary/Chest: Effort normal and breath sounds normal.   Abdominal: Soft. There is no tenderness.   Neurological: He is alert.   Psychiatric: He has a normal mood and affect. His behavior is normal. Thought content normal.   Nursing note and vitals reviewed.      Urine drug screen negative except buprenorphine. Not done last month due to us not having test strips. .     Assessment:       1. Narcotic dependence          Plan:   (+) pt taking medication as " prescribed.    (+) dose is approproate.    (+) urine drug screen done.   (+) discussed risks of buprenorphine, including to others.     (+) assessed if benefits outweigh risks of buprenorphine. .     (+) reviewed safe storage of medication.     (+) discussed proper use of buprenorphine, including missed doses.    Narcotic dependence  -     Discontinue: buprenorphine-naloxone 8-2 mg (SUBOXONE) 8-2 mg Subl; Place 1 tablet under the tongue 2 (two) times daily. May substitue films for tablets.  Dispense: 60 tablet; Refill: 0  -     buprenorphine-naloxone (SUBOXONE) 8-2 mg Film; One half sublingual 3 times a day  Dispense: 45 packet; Refill: 0      Return in about 1 month (around 9/7/2017).

## 2017-09-13 ENCOUNTER — DOCUMENTATION ONLY (OUTPATIENT)
Dept: FAMILY MEDICINE | Facility: CLINIC | Age: 32
End: 2017-09-13

## 2017-09-13 ENCOUNTER — OFFICE VISIT (OUTPATIENT)
Dept: FAMILY MEDICINE | Facility: CLINIC | Age: 32
End: 2017-09-13
Payer: COMMERCIAL

## 2017-09-13 VITALS
RESPIRATION RATE: 16 BRPM | HEART RATE: 68 BPM | BODY MASS INDEX: 30.19 KG/M2 | HEIGHT: 71 IN | OXYGEN SATURATION: 97 % | SYSTOLIC BLOOD PRESSURE: 138 MMHG | WEIGHT: 215.63 LBS | TEMPERATURE: 98 F | DIASTOLIC BLOOD PRESSURE: 80 MMHG

## 2017-09-13 DIAGNOSIS — F11.20 NARCOTIC DEPENDENCE: ICD-10-CM

## 2017-09-13 PROCEDURE — 99213 OFFICE O/P EST LOW 20 MIN: CPT | Mod: S$GLB,,, | Performed by: INTERNAL MEDICINE

## 2017-09-13 PROCEDURE — 3008F BODY MASS INDEX DOCD: CPT | Mod: S$GLB,,, | Performed by: INTERNAL MEDICINE

## 2017-09-13 RX ORDER — BUPRENORPHINE AND NALOXONE 8; 2 MG/1; MG/1
FILM, SOLUBLE BUCCAL; SUBLINGUAL
Qty: 60 PACKET | Refills: 0 | Status: SHIPPED | OUTPATIENT
Start: 2017-09-13 | End: 2017-10-09 | Stop reason: SDUPTHER

## 2017-09-13 NOTE — PROGRESS NOTES
"Subjective:       Patient ID: Vargas Lozano is a 31 y.o. male.    Chief Complaint: opioid dependence    HPI     The patient presents for medical management of opioid dependency. he is receiving maintenance therapy with buprenorphine.      CHIEF COMPLAINT: opoid dependence  HPI:     ONSET/TIMING:     DURATION: . Continuous(+).    QUALITY/COURSE:  unchanged.     INTENSITY/SEVERITY:  controlled.    CONTEXT/WHEN:     MODIFIERS/TREATMENTS:  Taking medications(+) Suboxone  8/2 # 60,   last filled 8/7/17. . .    SYMPTOMS/RELATED: no withdrawal symptoms. no cravings . No substance abuse.  No alcohol use     pnp checked: last month:  yes    Review of Systems    Objective:      Vitals:    09/13/17 1042   BP: 138/80   Pulse: 68   Resp: 16   Temp: 98.4 °F (36.9 °C)   TempSrc: Oral   SpO2: 97%   Weight: 97.8 kg (215 lb 9.8 oz)   Height: 5' 11" (1.803 m)   PainSc: 0-No pain     Physical Exam   Constitutional: He appears well-developed and well-nourished.   Eyes: Pupils are equal, round, and reactive to light.   Cardiovascular: Normal rate, regular rhythm and normal heart sounds.    Pulmonary/Chest: Effort normal and breath sounds normal.   Abdominal: Soft. There is no tenderness.   Neurological: He is alert.   Psychiatric: He has a normal mood and affect. His behavior is normal. Thought content normal.   Nursing note and vitals reviewed.  drug screen was done at [facility], was misplaced by us.       Assessment:       1. Narcotic dependence          Plan:         Narcotic dependence  -     buprenorphine-naloxone (SUBOXONE) 8-2 mg Film; 1 sl bid  Dispense: 60 packet; Refill: 0  -     Miscellaneous Sendout Test Urine; Future; Expected date: 09/13/2017  -     Miscellaneous Sendout Test Urine; Future; Expected date: 09/13/2017      Return in about 1 month (around 10/13/2017).      "

## 2017-09-27 ENCOUNTER — TELEPHONE (OUTPATIENT)
Dept: FAMILY MEDICINE | Facility: CLINIC | Age: 32
End: 2017-09-27

## 2017-10-06 ENCOUNTER — DOCUMENTATION ONLY (OUTPATIENT)
Dept: FAMILY MEDICINE | Facility: CLINIC | Age: 32
End: 2017-10-06

## 2017-10-09 ENCOUNTER — OFFICE VISIT (OUTPATIENT)
Dept: FAMILY MEDICINE | Facility: CLINIC | Age: 32
End: 2017-10-09
Payer: COMMERCIAL

## 2017-10-09 VITALS
SYSTOLIC BLOOD PRESSURE: 123 MMHG | OXYGEN SATURATION: 98 % | RESPIRATION RATE: 16 BRPM | TEMPERATURE: 98 F | DIASTOLIC BLOOD PRESSURE: 82 MMHG | HEART RATE: 79 BPM | BODY MASS INDEX: 30.37 KG/M2 | WEIGHT: 216.94 LBS | HEIGHT: 71 IN

## 2017-10-09 DIAGNOSIS — F11.20 NARCOTIC DEPENDENCE: ICD-10-CM

## 2017-10-09 PROCEDURE — 99213 OFFICE O/P EST LOW 20 MIN: CPT | Mod: S$GLB,,, | Performed by: INTERNAL MEDICINE

## 2017-10-09 RX ORDER — BUPRENORPHINE AND NALOXONE 8; 2 MG/1; MG/1
FILM, SOLUBLE BUCCAL; SUBLINGUAL
Qty: 40 PACKET | Refills: 0 | Status: SHIPPED | OUTPATIENT
Start: 2017-10-09 | End: 2017-11-01 | Stop reason: SDUPTHER

## 2017-10-09 NOTE — PROGRESS NOTES
"Subjective:       Patient ID: Vargas Lozano is a 31 y.o. male.    Chief Complaint: opioid dependence    HPI     The patient presents for medical management of opioid dependency. he is receiving maintenance therapy with buprenorphine.      CHIEF COMPLAINT: opoid dependence  HPI:     ONSET/TIMING:     DURATION: . Continuous(+).    QUALITY/COURSE:  unchanged.     INTENSITY/SEVERITY:  controlled.    CONTEXT/WHEN:     MODIFIERS/TREATMENTS:  Taking medications(+) Suboxone  8/2 # 60,   last filled 9/13/17. . .    SYMPTOMS/RELATED: no withdrawal symptoms. no cravings . No substance abuse.  No alcohol use     pnp checked: last month:  yes    Review of Systems   Constitutional: Negative for fatigue and unexpected weight change.   Cardiovascular: Negative for leg swelling.   Gastrointestinal: Negative for constipation, diarrhea and nausea.   Musculoskeletal: Negative for gait problem.   Neurological: Negative for dizziness, tremors and headaches.   Psychiatric/Behavioral: Negative for behavioral problems, dysphoric mood and sleep disturbance.       Objective:      Vitals:    10/09/17 0930   BP: 123/82   Pulse: 79   Resp: 16   Temp: 98.2 °F (36.8 °C)   TempSrc: Oral   SpO2: 98%   Weight: 98.4 kg (216 lb 14.9 oz)   Height: 5' 11" (1.803 m)   PainSc: 0-No pain     Physical Exam   Constitutional: He appears well-developed and well-nourished.   Eyes: Pupils are equal, round, and reactive to light.   Cardiovascular: Normal rate, regular rhythm and normal heart sounds.    Pulmonary/Chest: Effort normal and breath sounds normal.   Abdominal: Soft. There is no tenderness.   Neurological: He is alert.   Psychiatric: He has a normal mood and affect. His behavior is normal. Thought content normal.   Nursing note and vitals reviewed.        Assessment:       1. Narcotic dependence          Plan:   (+) pt taking medication as prescribed.    (+) dose is approproate.    (-) urine drug screen done. Will give pt 2/3 of rx for suboxone. "     (+) discussed risks of buprenorphine, including to others.     (+) assessed if benefits outweigh risks of buprenorphine. .     (+) reviewed safe storage of medication.     (+) discussed proper use of buprenorphine, including missed doses.    Narcotic dependence  -     buprenorphine-naloxone (SUBOXONE) 8-2 mg Film; 1 sl bid  Dispense: 40 packet; Refill: 0  -     Miscellaneous Sendout Test Urine; Future; Expected date: 10/09/2017  -     Miscellaneous Sendout Test Urine; Future; Expected date: 10/09/2017      No Follow-up on file.

## 2017-10-18 ENCOUNTER — TELEPHONE (OUTPATIENT)
Dept: FAMILY MEDICINE | Facility: CLINIC | Age: 32
End: 2017-10-18

## 2017-11-01 ENCOUNTER — TELEPHONE (OUTPATIENT)
Dept: FAMILY MEDICINE | Facility: CLINIC | Age: 32
End: 2017-11-01

## 2017-11-01 DIAGNOSIS — F11.20 NARCOTIC DEPENDENCE: ICD-10-CM

## 2017-11-01 RX ORDER — BUPRENORPHINE AND NALOXONE 8; 2 MG/1; MG/1
FILM, SOLUBLE BUCCAL; SUBLINGUAL
Qty: 20 PACKET | Refills: 0 | Status: SHIPPED | OUTPATIENT
Start: 2017-11-01 | End: 2017-11-09 | Stop reason: SDUPTHER

## 2017-11-06 ENCOUNTER — DOCUMENTATION ONLY (OUTPATIENT)
Dept: FAMILY MEDICINE | Facility: CLINIC | Age: 32
End: 2017-11-06

## 2017-11-09 ENCOUNTER — OFFICE VISIT (OUTPATIENT)
Dept: FAMILY MEDICINE | Facility: CLINIC | Age: 32
End: 2017-11-09
Payer: COMMERCIAL

## 2017-11-09 VITALS
DIASTOLIC BLOOD PRESSURE: 77 MMHG | BODY MASS INDEX: 30.9 KG/M2 | HEIGHT: 71 IN | RESPIRATION RATE: 16 BRPM | SYSTOLIC BLOOD PRESSURE: 116 MMHG | WEIGHT: 220.69 LBS | TEMPERATURE: 98 F | OXYGEN SATURATION: 97 % | HEART RATE: 75 BPM

## 2017-11-09 DIAGNOSIS — F11.20 NARCOTIC DEPENDENCE: ICD-10-CM

## 2017-11-09 PROCEDURE — 99213 OFFICE O/P EST LOW 20 MIN: CPT | Mod: S$GLB,,, | Performed by: INTERNAL MEDICINE

## 2017-11-09 RX ORDER — BUPRENORPHINE AND NALOXONE 8; 2 MG/1; MG/1
FILM, SOLUBLE BUCCAL; SUBLINGUAL
Qty: 60 PACKET | Refills: 0 | Status: SHIPPED | OUTPATIENT
Start: 2017-11-09 | End: 2017-12-07 | Stop reason: SDUPTHER

## 2017-11-09 RX ORDER — NALOXONE HYDROCHLORIDE 4 MG/.1ML
1 SPRAY NASAL ONCE
Qty: 1 EACH | Refills: 0 | Status: SHIPPED | OUTPATIENT
Start: 2017-11-09 | End: 2017-11-09

## 2017-11-09 NOTE — PROGRESS NOTES
"Subjective:       Patient ID: Vargas Lozano is a 31 y.o. male.    Chief Complaint: opioid dependence    HPI The patient presents for medical management of opioid dependency. he is receiving maintenance therapy with buprenorphine.      CHIEF COMPLAINT: opoid dependence  HPI:     ONSET/TIMING:     DURATION: . Continuous(+).    QUALITY/COURSE:  unchanged.     INTENSITY/SEVERITY:  controlled.    CONTEXT/WHEN:     MODIFIERS/TREATMENTS:  Taking medications(+) Suboxone  8/2 # 60,   last rx given 10/9/17. . .    SYMPTOMS/RELATED: no withdrawal symptoms. no cravings . No substance abuse.  No alcohol use     pnp checked: last month:  yes    Review of Systems   Constitutional: Negative for fatigue and unexpected weight change.   Cardiovascular: Negative for leg swelling.   Gastrointestinal: Negative for constipation, diarrhea and nausea.   Musculoskeletal: Negative for gait problem.   Neurological: Negative for dizziness, tremors and headaches.   Psychiatric/Behavioral: Negative for behavioral problems, dysphoric mood and sleep disturbance.       Objective:      Vitals:    11/09/17 1127   BP: 116/77   Pulse: 75   Resp: 16   Temp: 98.1 °F (36.7 °C)   TempSrc: Oral   SpO2: 97%   Weight: 100.1 kg (220 lb 10.9 oz)   Height: 5' 11" (1.803 m)   PainSc: 0-No pain     Physical Exam   Constitutional: He appears well-developed and well-nourished.   Eyes: Pupils are equal, round, and reactive to light.   Cardiovascular: Normal rate, regular rhythm and normal heart sounds.    Pulmonary/Chest: Effort normal and breath sounds normal.   Abdominal: Soft. There is no tenderness.   Neurological: He is alert.   Psychiatric: He has a normal mood and affect. His behavior is normal. Thought content normal.   Nursing note and vitals reviewed.  Urine drug screen negative except buprenorphine.         Assessment:       1. Narcotic dependence          Plan:     Narcotic dependence  -     buprenorphine-naloxone (SUBOXONE) 8-2 mg Film; 1 sl bid  " Dispense: 60 packet; Refill: 0  -     naloxone (NARCAN) 4 mg/actuation Spry; 1 spray (4 mg total) by Nasal route once.  Dispense: 1 each; Refill: 0      Return in about 1 month (around 12/9/2017).

## 2017-11-30 ENCOUNTER — TELEPHONE (OUTPATIENT)
Dept: FAMILY MEDICINE | Facility: CLINIC | Age: 32
End: 2017-11-30

## 2017-12-06 ENCOUNTER — DOCUMENTATION ONLY (OUTPATIENT)
Dept: FAMILY MEDICINE | Facility: CLINIC | Age: 32
End: 2017-12-06

## 2017-12-07 ENCOUNTER — OFFICE VISIT (OUTPATIENT)
Dept: FAMILY MEDICINE | Facility: CLINIC | Age: 32
End: 2017-12-07
Payer: COMMERCIAL

## 2017-12-07 VITALS
BODY MASS INDEX: 30.53 KG/M2 | RESPIRATION RATE: 16 BRPM | OXYGEN SATURATION: 100 % | HEIGHT: 71 IN | HEART RATE: 64 BPM | TEMPERATURE: 98 F | SYSTOLIC BLOOD PRESSURE: 126 MMHG | DIASTOLIC BLOOD PRESSURE: 88 MMHG | WEIGHT: 218.06 LBS

## 2017-12-07 DIAGNOSIS — F11.20 OPIOID TYPE DEPENDENCE, CONTINUOUS: Primary | ICD-10-CM

## 2017-12-07 DIAGNOSIS — F11.20 NARCOTIC DEPENDENCE: ICD-10-CM

## 2017-12-07 PROCEDURE — 99213 OFFICE O/P EST LOW 20 MIN: CPT | Mod: S$GLB,,, | Performed by: INTERNAL MEDICINE

## 2017-12-07 PROCEDURE — 80305 DRUG TEST PRSMV DIR OPT OBS: CPT | Mod: QW,S$GLB,, | Performed by: INTERNAL MEDICINE

## 2017-12-07 RX ORDER — BUPRENORPHINE AND NALOXONE 8; 2 MG/1; MG/1
FILM, SOLUBLE BUCCAL; SUBLINGUAL
Qty: 60 PACKET | Refills: 0 | Status: SHIPPED | OUTPATIENT
Start: 2017-12-07 | End: 2018-01-05 | Stop reason: SDUPTHER

## 2017-12-07 NOTE — PROGRESS NOTES
"Subjective:       Patient ID: Vargas Lozano is a 32 y.o. male.    Chief Complaint: opioid dependence    HPI     The patient presents for medical management of opioid dependency. he is receiving maintenance therapy with buprenorphine.      CHIEF COMPLAINT: opoid dependence  HPI:     ONSET/TIMING:     DURATION: . Continuous(+).    QUALITY/COURSE:  unchanged.     INTENSITY/SEVERITY:  controlled.    CONTEXT/WHEN:     MODIFIERS/TREATMENTS:  Taking medications(+) Suboxone  8/2 # 60,   last rx given 11/9/17 . .    SYMPTOMS/RELATED: no withdrawal symptoms. no cravings . No substance abuse.  No alcohol use     pnp checked: last month:  yes    Review of Systems   Constitutional: Negative for fatigue and unexpected weight change.   Cardiovascular: Negative for leg swelling.   Gastrointestinal: Negative for constipation, diarrhea and nausea.   Musculoskeletal: Negative for gait problem.   Neurological: Negative for dizziness, tremors and headaches.   Psychiatric/Behavioral: Negative for behavioral problems, dysphoric mood and sleep disturbance.       Objective:      Vitals:    12/07/17 1042   BP: 126/88   Pulse: 64   Resp: 16   Temp: 98.1 °F (36.7 °C)   TempSrc: Oral   SpO2: 100%   Weight: 98.9 kg (218 lb 0.6 oz)   Height: 5' 11" (1.803 m)   PainSc: 0-No pain     Physical Exam   Constitutional: He appears well-developed and well-nourished.   Eyes: Pupils are equal, round, and reactive to light.   Cardiovascular: Normal rate, regular rhythm and normal heart sounds.    Pulmonary/Chest: Effort normal and breath sounds normal.   Abdominal: Soft. There is no tenderness.   Neurological: He is alert.   Psychiatric: He has a normal mood and affect. His behavior is normal. Thought content normal.   Nursing note and vitals reviewed.  Urine drug screen negative except buprenorphine.         Assessment:       1. Opioid type dependence, continuous    2. Narcotic dependence          Plan:   (+) pt taking medication as " prescribed.    (+) dose is approproate.    (+) urine drug screen done.   (+) discussed risks of buprenorphine, including to others.     (+) assessed if benefits outweigh risks of buprenorphine. .     (+) reviewed safe storage of medication.     (+) discussed proper use of buprenorphine, including missed doses.    Opioid type dependence, continuous  -     POCT [facility] Urine Drug Test; Future; Expected date: 12/07/2017    Narcotic dependence  -     buprenorphine-naloxone (SUBOXONE) 8-2 mg Film; 1 sl bid  Dispense: 60 packet; Refill: 0      Return in about 1 month (around 1/7/2018).

## 2017-12-26 ENCOUNTER — TELEPHONE (OUTPATIENT)
Dept: PRIMARY CARE CLINIC | Facility: CLINIC | Age: 32
End: 2017-12-26

## 2017-12-26 NOTE — TELEPHONE ENCOUNTER
Spoke with patient notified that he needs to come in between from now until 4pm tomorrow for a DS patient has verbal understanding of orders.

## 2018-01-05 ENCOUNTER — DOCUMENTATION ONLY (OUTPATIENT)
Dept: FAMILY MEDICINE | Facility: CLINIC | Age: 33
End: 2018-01-05

## 2018-01-05 ENCOUNTER — OFFICE VISIT (OUTPATIENT)
Dept: FAMILY MEDICINE | Facility: CLINIC | Age: 33
End: 2018-01-05

## 2018-01-05 VITALS
DIASTOLIC BLOOD PRESSURE: 84 MMHG | HEART RATE: 96 BPM | OXYGEN SATURATION: 99 % | SYSTOLIC BLOOD PRESSURE: 136 MMHG | HEIGHT: 71 IN | WEIGHT: 222 LBS | RESPIRATION RATE: 16 BRPM | BODY MASS INDEX: 31.08 KG/M2

## 2018-01-05 DIAGNOSIS — F11.20 NARCOTIC DEPENDENCE: ICD-10-CM

## 2018-01-05 PROCEDURE — 99213 OFFICE O/P EST LOW 20 MIN: CPT | Mod: S$GLB,,, | Performed by: INTERNAL MEDICINE

## 2018-01-05 RX ORDER — BUPRENORPHINE AND NALOXONE 8; 2 MG/1; MG/1
FILM, SOLUBLE BUCCAL; SUBLINGUAL
Qty: 60 PACKET | Refills: 0 | Status: SHIPPED | OUTPATIENT
Start: 2018-01-05 | End: 2018-02-02 | Stop reason: SDUPTHER

## 2018-01-05 NOTE — PROGRESS NOTES
"Subjective:       Patient ID: Vargas Lozano is a 32 y.o. male.    Chief Complaint: opioid dependence    HPI   The patient presents for medical management of opioid dependency. he is receiving maintenance therapy with buprenorphine.      CHIEF COMPLAINT: opoid dependence  HPI:     ONSET/TIMING:     DURATION: . Continuous(+).    QUALITY/COURSE:  unchanged.     INTENSITY/SEVERITY:  controlled.    CONTEXT/WHEN:     MODIFIERS/TREATMENTS:  Taking medications(+) Suboxone  8/2 # 60,   last rx given 12/7/17. . .    SYMPTOMS/RELATED: no withdrawal symptoms. no cravings . No substance abuse.  No alcohol use     pnp checked: last month:  yes    Review of Systems   Constitutional: Negative for fatigue and unexpected weight change.   Cardiovascular: Negative for leg swelling.   Gastrointestinal: Negative for constipation, diarrhea and nausea.   Musculoskeletal: Negative for gait problem.   Neurological: Negative for dizziness, tremors and headaches.   Psychiatric/Behavioral: Negative for behavioral problems, dysphoric mood and sleep disturbance.       Objective:      Vitals:    01/05/18 1348   BP: 136/84   Pulse: 96   Resp: 16   SpO2: 99%   Weight: 100.7 kg (222 lb 0.1 oz)   Height: 5' 11" (1.803 m)   PainSc: 0-No pain     Physical Exam   Constitutional: He appears well-developed and well-nourished.   Eyes: Pupils are equal, round, and reactive to light.   Cardiovascular: Normal rate, regular rhythm and normal heart sounds.    Pulmonary/Chest: Effort normal and breath sounds normal.   Abdominal: Soft. There is no tenderness.   Neurological: He is alert.   Psychiatric: He has a normal mood and affect. His behavior is normal. Thought content normal.   Nursing note and vitals reviewed.      Urine drug screen negative except buprenorphine.     Assessment:       1. Narcotic dependence          Plan:   (+) pt taking medication as prescribed.    (+) dose is approproate.    (+) drug screen done    (+) discussed risks of suboxone, " including to others.     (+) assessed if benefits outweigh risks of suboxone. .     (+) reviewed safe storage of medication.     (+) discussed proper use of suboxone, including missed doses.  Narcotic dependence  -     buprenorphine-naloxone (SUBOXONE) 8-2 mg Film; 1 sl bid  Dispense: 60 packet; Refill: 0      Return in about 1 month (around 2/5/2018).

## 2018-01-09 ENCOUNTER — TELEPHONE (OUTPATIENT)
Dept: FAMILY MEDICINE | Facility: CLINIC | Age: 33
End: 2018-01-09

## 2018-02-02 ENCOUNTER — DOCUMENTATION ONLY (OUTPATIENT)
Dept: FAMILY MEDICINE | Facility: CLINIC | Age: 33
End: 2018-02-02

## 2018-02-02 ENCOUNTER — OFFICE VISIT (OUTPATIENT)
Dept: FAMILY MEDICINE | Facility: CLINIC | Age: 33
End: 2018-02-02

## 2018-02-02 VITALS
DIASTOLIC BLOOD PRESSURE: 80 MMHG | RESPIRATION RATE: 16 BRPM | BODY MASS INDEX: 29.94 KG/M2 | SYSTOLIC BLOOD PRESSURE: 130 MMHG | WEIGHT: 213.88 LBS | TEMPERATURE: 98 F | HEART RATE: 64 BPM | HEIGHT: 71 IN | OXYGEN SATURATION: 99 %

## 2018-02-02 DIAGNOSIS — F11.20 NARCOTIC DEPENDENCE: ICD-10-CM

## 2018-02-02 PROCEDURE — 3008F BODY MASS INDEX DOCD: CPT | Mod: S$GLB,,, | Performed by: INTERNAL MEDICINE

## 2018-02-02 PROCEDURE — 99213 OFFICE O/P EST LOW 20 MIN: CPT | Mod: S$GLB,,, | Performed by: INTERNAL MEDICINE

## 2018-02-02 RX ORDER — BUPRENORPHINE AND NALOXONE 8; 2 MG/1; MG/1
FILM, SOLUBLE BUCCAL; SUBLINGUAL
Qty: 60 PACKET | Refills: 0 | Status: SHIPPED | OUTPATIENT
Start: 2018-02-02 | End: 2018-03-02 | Stop reason: SDUPTHER

## 2018-02-02 NOTE — PROGRESS NOTES
"Subjective:       Patient ID: Vargas Lozano is a 32 y.o. male.    Chief Complaint: opioid dependence    HPI   The patient presents for medical management of opioid dependency. he is receiving maintenance therapy with buprenorphine.      CHIEF COMPLAINT: opoid dependence  HPI:     ONSET/TIMING:     DURATION: . Continuous(+).    QUALITY/COURSE:  unchanged.     INTENSITY/SEVERITY:  controlled.     CONTEXT/WHEN:     MODIFIERS/TREATMENTS:  Taking medications(+) Suboxone  8/2 # 60,   last rx given 1/5/18. . .    SYMPTOMS/RELATED: no withdrawal symptoms. no cravings . No substance abuse.  No alcohol use     pnp checked: last month:  yes    Review of Systems   Constitutional: Negative for fatigue and unexpected weight change.   Cardiovascular: Negative for leg swelling.   Gastrointestinal: Negative for constipation, diarrhea and nausea.   Musculoskeletal: Negative for gait problem.   Neurological: Negative for dizziness, tremors and headaches.   Psychiatric/Behavioral: Negative for behavioral problems, dysphoric mood and sleep disturbance.       Objective:      Vitals:    02/02/18 1134   BP: 130/80   Pulse: 64   Resp: 16   Temp: 97.9 °F (36.6 °C)   TempSrc: Oral   SpO2: 99%   Weight: 97 kg (213 lb 13.5 oz)   Height: 5' 11" (1.803 m)   PainSc: 0-No pain     Physical Exam   Constitutional: He appears well-developed and well-nourished.   Eyes: Pupils are equal, round, and reactive to light.   Cardiovascular: Normal rate, regular rhythm and normal heart sounds.    Pulmonary/Chest: Effort normal and breath sounds normal.   Abdominal: Soft. There is no tenderness.   Neurological: He is alert.   Psychiatric: He has a normal mood and affect. His behavior is normal. Thought content normal.   Nursing note and vitals reviewed.      Urine drug screen negative except buprenorphine.     Assessment:       1. Narcotic dependence          Plan:   (+) pt taking medication as prescribed.    (+) dose is approproate.    (+) urine drug " screen done.   (+) discussed risks of buprenorphine, including to others.     (+) assessed if benefits outweigh risks of buprenorphine. .     (+) reviewed safe storage of medication.     (+) discussed proper use of buprenorphine, including missed doses.    Narcotic dependence  -     buprenorphine-naloxone (SUBOXONE) 8-2 mg Film; 1 sl bid  Dispense: 60 packet; Refill: 0      Follow-up in about 1 month (around 3/2/2018).

## 2018-02-08 ENCOUNTER — TELEPHONE (OUTPATIENT)
Dept: FAMILY MEDICINE | Facility: CLINIC | Age: 33
End: 2018-02-08

## 2018-03-02 ENCOUNTER — OFFICE VISIT (OUTPATIENT)
Dept: FAMILY MEDICINE | Facility: CLINIC | Age: 33
End: 2018-03-02

## 2018-03-02 ENCOUNTER — DOCUMENTATION ONLY (OUTPATIENT)
Dept: FAMILY MEDICINE | Facility: CLINIC | Age: 33
End: 2018-03-02

## 2018-03-02 VITALS
SYSTOLIC BLOOD PRESSURE: 102 MMHG | WEIGHT: 204.56 LBS | HEIGHT: 71 IN | RESPIRATION RATE: 16 BRPM | OXYGEN SATURATION: 99 % | BODY MASS INDEX: 28.64 KG/M2 | DIASTOLIC BLOOD PRESSURE: 62 MMHG | HEART RATE: 68 BPM | TEMPERATURE: 98 F

## 2018-03-02 DIAGNOSIS — F11.20 NARCOTIC DEPENDENCE: ICD-10-CM

## 2018-03-02 PROCEDURE — 99213 OFFICE O/P EST LOW 20 MIN: CPT | Mod: S$GLB,,, | Performed by: INTERNAL MEDICINE

## 2018-03-02 RX ORDER — BUPRENORPHINE AND NALOXONE 8; 2 MG/1; MG/1
FILM, SOLUBLE BUCCAL; SUBLINGUAL
Qty: 60 PACKET | Refills: 0 | Status: SHIPPED | OUTPATIENT
Start: 2018-03-02 | End: 2018-03-26 | Stop reason: SDUPTHER

## 2018-03-02 NOTE — PROGRESS NOTES
"Subjective:       Patient ID: Vargas Lozano is a 32 y.o. male.    Chief Complaint: opioid dependence    HPI   The patient presents for medical management of opioid dependency. he is receiving maintenance therapy with buprenorphine.      CHIEF COMPLAINT: opoid dependence  HPI:     ONSET/TIMING:     DURATION: . Continuous(+).    QUALITY/COURSE:  unchanged.     INTENSITY/SEVERITY:  controlled.    CONTEXT/WHEN:     MODIFIERS/TREATMENTS:  Taking medications(+) Suboxone  8/2 # 60,   last rx given 2/2/18. . .    SYMPTOMS/RELATED: no withdrawal symptoms. no cravings . No substance abuse.  No alcohol use     pnp checked: last month:  yes    Review of Systems   Constitutional: Negative for fatigue and unexpected weight change.   Cardiovascular: Negative for leg swelling.   Gastrointestinal: Negative for constipation, diarrhea and nausea.   Musculoskeletal: Negative for gait problem.   Neurological: Negative for dizziness, tremors and headaches.   Psychiatric/Behavioral: Negative for behavioral problems, dysphoric mood and sleep disturbance.       Objective:      Vitals:    03/02/18 0904   BP: 102/62   Pulse: 68   Resp: 16   Temp: 98 °F (36.7 °C)   TempSrc: Oral   SpO2: 99%   Weight: 92.8 kg (204 lb 9.4 oz)   Height: 5' 11" (1.803 m)   PainSc: 0-No pain     Physical Exam   Constitutional: He appears well-developed and well-nourished.   Cardiovascular: Normal rate, regular rhythm and normal heart sounds.    Pulmonary/Chest: Effort normal and breath sounds normal.   Abdominal: Soft. There is no tenderness.   Neurological: He is alert.   Psychiatric: He has a normal mood and affect. His behavior is normal. Thought content normal.   Nursing note and vitals reviewed.  Urine drug screen negative except buprenorphine.         Assessment:       1. Narcotic dependence          Plan:   (+) pt taking medication as prescribed.    (+) dose is approproate.    (+) urine drug screen done.   (+) discussed risks of buprenorphine, " including to others.     (+) assessed if benefits outweigh risks of buprenorphine. .     (+) reviewed safe storage of medication.     (+) discussed proper use of buprenorphine, including missed doses.    Narcotic dependence  -     buprenorphine-naloxone (SUBOXONE) 8-2 mg Film; 1 sl bid  Dispense: 60 packet; Refill: 0      Follow-up in about 1 month (around 4/2/2018).

## 2018-03-26 ENCOUNTER — OFFICE VISIT (OUTPATIENT)
Dept: FAMILY MEDICINE | Facility: CLINIC | Age: 33
End: 2018-03-26

## 2018-03-26 ENCOUNTER — DOCUMENTATION ONLY (OUTPATIENT)
Dept: FAMILY MEDICINE | Facility: CLINIC | Age: 33
End: 2018-03-26

## 2018-03-26 VITALS
HEART RATE: 78 BPM | DIASTOLIC BLOOD PRESSURE: 68 MMHG | OXYGEN SATURATION: 100 % | HEIGHT: 71 IN | WEIGHT: 201.06 LBS | BODY MASS INDEX: 28.15 KG/M2 | RESPIRATION RATE: 16 BRPM | TEMPERATURE: 98 F | SYSTOLIC BLOOD PRESSURE: 120 MMHG

## 2018-03-26 DIAGNOSIS — F11.20 NARCOTIC DEPENDENCE: ICD-10-CM

## 2018-03-26 PROCEDURE — 99213 OFFICE O/P EST LOW 20 MIN: CPT | Mod: S$GLB,,, | Performed by: INTERNAL MEDICINE

## 2018-03-26 RX ORDER — BUPRENORPHINE AND NALOXONE 8; 2 MG/1; MG/1
FILM, SOLUBLE BUCCAL; SUBLINGUAL
Qty: 60 PACKET | Refills: 0 | Status: SHIPPED | OUTPATIENT
Start: 2018-03-26 | End: 2018-04-27 | Stop reason: SDUPTHER

## 2018-03-26 NOTE — PROGRESS NOTES
"Subjective:       Patient ID: Vargas Lozano is a 32 y.o. male.    Chief Complaint: opioid dependence    HPI     The patient presents for medical management of opioid dependency. he is receiving maintenance therapy with buprenorphine.      CHIEF COMPLAINT: opoid dependence  HPI:     ONSET/TIMING:     DURATION: . Continuous(+).    QUALITY/COURSE:  unchanged.     INTENSITY/SEVERITY:  controlled.     CONTEXT/WHEN:     MODIFIERS/TREATMENTS:  Taking medications(+) Suboxone  8/2 # 60,   last rx given 3/2/18    SYMPTOMS/RELATED: no withdrawal symptoms. no cravings . No substance abuse.  No alcohol use     pnp checked: last month:  yes    Review of Systems   Constitutional: Negative for fatigue and unexpected weight change.   Cardiovascular: Negative for leg swelling.   Gastrointestinal: Negative for constipation, diarrhea and nausea.   Musculoskeletal: Negative for gait problem.   Neurological: Negative for dizziness, tremors and headaches.   Psychiatric/Behavioral: Negative for behavioral problems, dysphoric mood and sleep disturbance.       Objective:      Vitals:    03/26/18 1549   BP: 120/68   Pulse: 78   Resp: 16   Temp: 98.1 °F (36.7 °C)   TempSrc: Oral   SpO2: 100%   Weight: 91.2 kg (201 lb 1 oz)   Height: 5' 11" (1.803 m)   PainSc: 0-No pain     Physical Exam   Constitutional: He appears well-developed and well-nourished.   Eyes: Pupils are equal, round, and reactive to light.   Cardiovascular: Normal rate, regular rhythm and normal heart sounds.    Pulmonary/Chest: Effort normal and breath sounds normal.   Abdominal: Soft. There is no tenderness.   Neurological: He is alert.   Psychiatric: He has a normal mood and affect. His behavior is normal. Thought content normal.   Nursing note and vitals reviewed.      Urine drug screen negative except buprenorphine.       Assessment:       1. Narcotic dependence          Plan:   (+) pt taking medication as prescribed.    (+) dose is approproate.    (+) urine drug " screen done.   (+) discussed risks of buprenorphine, including to others.     (+) assessed if benefits outweigh risks of buprenorphine. .     (+) reviewed safe storage of medication.     (+) discussed proper use of buprenorphine, including missed doses.    Narcotic dependence  -     buprenorphine-naloxone (SUBOXONE) 8-2 mg Film; 1 sl bid  Dispense: 60 packet; Refill: 0      Follow-up in about 1 month (around 4/26/2018).

## 2018-03-27 ENCOUNTER — TELEPHONE (OUTPATIENT)
Dept: FAMILY MEDICINE | Facility: CLINIC | Age: 33
End: 2018-03-27

## 2018-04-26 ENCOUNTER — TELEPHONE (OUTPATIENT)
Dept: FAMILY MEDICINE | Facility: CLINIC | Age: 33
End: 2018-04-26

## 2018-04-26 NOTE — TELEPHONE ENCOUNTER
----- Message from Jean Arvizu sent at 4/26/2018 11:53 AM CDT -----  Contact: Pt  Name of Who is Calling:Vargas      What is the request in detail: earlier appointment access      Can the clinic reply by [facility]:no      What Number to Call Back if not in [facility]: [phone]

## 2018-04-27 ENCOUNTER — OFFICE VISIT (OUTPATIENT)
Dept: FAMILY MEDICINE | Facility: CLINIC | Age: 33
End: 2018-04-27
Payer: COMMERCIAL

## 2018-04-27 VITALS
OXYGEN SATURATION: 99 % | SYSTOLIC BLOOD PRESSURE: 138 MMHG | DIASTOLIC BLOOD PRESSURE: 84 MMHG | RESPIRATION RATE: 16 BRPM | WEIGHT: 200.63 LBS | HEIGHT: 71 IN | TEMPERATURE: 98 F | HEART RATE: 66 BPM | BODY MASS INDEX: 28.09 KG/M2

## 2018-04-27 DIAGNOSIS — F11.20 NARCOTIC DEPENDENCE: ICD-10-CM

## 2018-04-27 PROCEDURE — 99213 OFFICE O/P EST LOW 20 MIN: CPT | Mod: S$GLB,,, | Performed by: INTERNAL MEDICINE

## 2018-04-27 RX ORDER — BUPRENORPHINE AND NALOXONE 8; 2 MG/1; MG/1
FILM, SOLUBLE BUCCAL; SUBLINGUAL
Qty: 40 PACKET | Refills: 0 | Status: SHIPPED | OUTPATIENT
Start: 2018-04-27 | End: 2018-05-15 | Stop reason: SDUPTHER

## 2018-04-27 NOTE — PROGRESS NOTES
"Subjective:       Patient ID: Vargas Lozano is a 32 y.o. male.    Chief Complaint: opioid dependence    HPI     The patient presents for medical management of opioid dependency. he is receiving maintenance therapy with buprenorphine.      CHIEF COMPLAINT: opoid dependence  HPI:     ONSET/TIMING:     DURATION: . Continuous(+).    QUALITY/COURSE:  unchanged.     INTENSITY/SEVERITY:  controlled.     CONTEXT/WHEN:     MODIFIERS/TREATMENTS:  Taking medications(+) Suboxone  8/2 # 60,   last rx given 3/26/18    SYMPTOMS/RELATED: no withdrawal symptoms. no cravings . No substance abuse.  No alcohol use     pnp checked: last month:  yes    Review of Systems   Constitutional: Negative for fatigue and unexpected weight change.   Cardiovascular: Negative for leg swelling.   Gastrointestinal: Negative for constipation, diarrhea and nausea.   Musculoskeletal: Negative for gait problem.   Neurological: Negative for dizziness, tremors and headaches.   Psychiatric/Behavioral: Negative for behavioral problems, dysphoric mood and sleep disturbance.       Objective:      Vitals:    04/27/18 1403   BP: 138/84   Pulse: 66   Resp: 16   Temp: 98.1 °F (36.7 °C)   TempSrc: Oral   SpO2: 99%   Weight: 91 kg (200 lb 9.9 oz)   Height: 5' 11" (1.803 m)   PainSc: 0-No pain     Physical Exam   Constitutional: He appears well-developed and well-nourished.   Eyes: Pupils are equal, round, and reactive to light.   Cardiovascular: Normal rate, regular rhythm and normal heart sounds.    Pulmonary/Chest: Effort normal and breath sounds normal.   Abdominal: Soft. There is no tenderness.   Neurological: He is alert.   Psychiatric: He has a normal mood and affect. His behavior is normal. Thought content normal.   Nursing note and vitals reviewed.          Assessment:       1. Narcotic dependence          Plan:   (+) pt taking medication as prescribed.    (+) dose is approproate.    (-) urine drug screen done. Will give pt 2/3 of rx for suboxone. "     (+) discussed risks of buprenorphine, including to others.     (+) assessed if benefits outweigh risks of buprenorphine. .     (+) reviewed safe storage of medication.     (+) discussed proper use of buprenorphine, including missed doses.      Narcotic dependence  -     buprenorphine-naloxone (SUBOXONE) 8-2 mg Film; 1 sl bid  Dispense: 40 packet; Refill: 0      Follow-up in about 1 month (around 5/27/2018).

## 2018-04-27 NOTE — PROGRESS NOTES
"Subjective:       Patient ID: Vargas Lozano is a 32 y.o. male.    Chief Complaint: opioid dependence    HPI  Review of Systems    Objective:      Vitals:    04/27/18 1403   BP: 138/84   Pulse: 66   Resp: 16   Temp: 98.1 °F (36.7 °C)   TempSrc: Oral   SpO2: 99%   Weight: 91 kg (200 lb 9.9 oz)   Height: 5' 11" (1.803 m)   PainSc: 0-No pain     Physical Exam      Assessment:       1. Narcotic dependence          Plan:     Narcotic dependence      Follow-up in about 1 month (around 5/27/2018).      "

## 2018-05-15 ENCOUNTER — TELEPHONE (OUTPATIENT)
Dept: FAMILY MEDICINE | Facility: CLINIC | Age: 33
End: 2018-05-15

## 2018-05-15 DIAGNOSIS — F11.20 NARCOTIC DEPENDENCE: ICD-10-CM

## 2018-05-15 RX ORDER — BUPRENORPHINE AND NALOXONE 8; 2 MG/1; MG/1
FILM, SOLUBLE BUCCAL; SUBLINGUAL
Qty: 20 PACKET | Refills: 0 | Status: SHIPPED | OUTPATIENT
Start: 2018-05-15 | End: 2018-05-23 | Stop reason: SDUPTHER

## 2018-05-23 ENCOUNTER — OFFICE VISIT (OUTPATIENT)
Dept: FAMILY MEDICINE | Facility: CLINIC | Age: 33
End: 2018-05-23
Payer: COMMERCIAL

## 2018-05-23 VITALS
DIASTOLIC BLOOD PRESSURE: 80 MMHG | TEMPERATURE: 98 F | OXYGEN SATURATION: 98 % | RESPIRATION RATE: 16 BRPM | HEART RATE: 66 BPM | SYSTOLIC BLOOD PRESSURE: 120 MMHG | WEIGHT: 196.44 LBS | BODY MASS INDEX: 27.5 KG/M2 | HEIGHT: 71 IN

## 2018-05-23 DIAGNOSIS — F11.20 NARCOTIC DEPENDENCE: ICD-10-CM

## 2018-05-23 PROCEDURE — 3008F BODY MASS INDEX DOCD: CPT | Mod: CPTII,S$GLB,, | Performed by: INTERNAL MEDICINE

## 2018-05-23 PROCEDURE — 99213 OFFICE O/P EST LOW 20 MIN: CPT | Mod: S$GLB,,, | Performed by: INTERNAL MEDICINE

## 2018-05-23 RX ORDER — BUPRENORPHINE AND NALOXONE 8; 2 MG/1; MG/1
FILM, SOLUBLE BUCCAL; SUBLINGUAL
Qty: 60 PACKET | Refills: 0 | Status: SHIPPED | OUTPATIENT
Start: 2018-05-23 | End: 2018-06-21 | Stop reason: SDUPTHER

## 2018-05-23 NOTE — PROGRESS NOTES
"Subjective:       Patient ID: Vargas Lozano is a 32 y.o. male.    Chief Complaint: opioid dependence    HPI     The patient presents for medical management of opioid dependency. he is receiving maintenance therapy with buprenorphine.      CHIEF COMPLAINT: opoid dependence  HPI:     ONSET/TIMING:     DURATION: . Continuous(+).    QUALITY/COURSE:  unchanged.     INTENSITY/SEVERITY:  controlled.     CONTEXT/WHEN:     MODIFIERS/TREATMENTS:  Taking medications(+) Suboxone  8/2 # 60,   last rx given 3/26/18    SYMPTOMS/RELATED: no withdrawal symptoms. no cravings . No substance abuse.  No alcohol use     pnp checked: last month:  yes    Review of Systems   Constitutional: Negative for fatigue and unexpected weight change.   Cardiovascular: Negative for leg swelling.   Gastrointestinal: Negative for constipation, diarrhea and nausea.   Musculoskeletal: Negative for gait problem.   Neurological: Negative for dizziness, tremors and headaches.   Psychiatric/Behavioral: Negative for behavioral problems, dysphoric mood and sleep disturbance.       Objective:      Vitals:    05/23/18 1126   BP: 120/80   Pulse: 66   Resp: 16   Temp: 97.7 °F (36.5 °C)   TempSrc: Oral   SpO2: 98%   Weight: 89.1 kg (196 lb 6.9 oz)   Height: 5' 11" (1.803 m)   PainSc: 0-No pain     Physical Exam   Constitutional: He appears well-developed and well-nourished.   Eyes: Pupils are equal, round, and reactive to light.   Cardiovascular: Normal rate, regular rhythm and normal heart sounds.    Pulmonary/Chest: Effort normal and breath sounds normal.   Abdominal: Soft. There is no tenderness.   Neurological: He is alert.   Psychiatric: He has a normal mood and affect. His behavior is normal. Thought content normal.   Nursing note and vitals reviewed.          Urine drug screen negative except buprenorphine.       Assessment:       1. Narcotic dependence          Plan:   (+) pt taking medication as prescribed.    (+) dose is approproate.    (+) urine " drug screen done.   (+) discussed risks of buprenorphine, including to others.     (+) assessed if benefits outweigh risks of buprenorphine. .     (+) reviewed safe storage of medication.     (+) discussed proper use of buprenorphine, including missed doses.        Narcotic dependence  -     buprenorphine-naloxone (SUBOXONE) 8-2 mg Film; 1 sl bid  Dispense: 60 packet; Refill: 0      Follow-up in about 1 month (around 6/23/2018).

## 2018-05-31 ENCOUNTER — TELEPHONE (OUTPATIENT)
Dept: FAMILY MEDICINE | Facility: CLINIC | Age: 33
End: 2018-05-31

## 2018-06-19 ENCOUNTER — TELEPHONE (OUTPATIENT)
Dept: FAMILY MEDICINE | Facility: CLINIC | Age: 33
End: 2018-06-19

## 2018-06-21 ENCOUNTER — OFFICE VISIT (OUTPATIENT)
Dept: FAMILY MEDICINE | Facility: CLINIC | Age: 33
End: 2018-06-21
Payer: COMMERCIAL

## 2018-06-21 VITALS
HEIGHT: 71 IN | OXYGEN SATURATION: 99 % | TEMPERATURE: 98 F | WEIGHT: 196.44 LBS | BODY MASS INDEX: 27.5 KG/M2 | DIASTOLIC BLOOD PRESSURE: 82 MMHG | SYSTOLIC BLOOD PRESSURE: 136 MMHG | HEART RATE: 71 BPM | RESPIRATION RATE: 16 BRPM

## 2018-06-21 DIAGNOSIS — F11.20 NARCOTIC DEPENDENCE: ICD-10-CM

## 2018-06-21 PROCEDURE — 99213 OFFICE O/P EST LOW 20 MIN: CPT | Mod: S$GLB,,, | Performed by: INTERNAL MEDICINE

## 2018-06-21 PROCEDURE — 80305 DRUG TEST PRSMV DIR OPT OBS: CPT | Mod: QW,S$GLB,, | Performed by: INTERNAL MEDICINE

## 2018-06-21 PROCEDURE — 3008F BODY MASS INDEX DOCD: CPT | Mod: CPTII,S$GLB,, | Performed by: INTERNAL MEDICINE

## 2018-06-21 RX ORDER — BUPRENORPHINE AND NALOXONE 8; 2 MG/1; MG/1
FILM, SOLUBLE BUCCAL; SUBLINGUAL
Qty: 60 PACKET | Refills: 0 | Status: SHIPPED | OUTPATIENT
Start: 2018-06-21 | End: 2018-07-19 | Stop reason: SDUPTHER

## 2018-06-21 NOTE — PATIENT INSTRUCTIONS
An order for a urine drug screen with buprenorphine was given.  The patient is to use the order when called, on a random day.

## 2018-06-21 NOTE — PROGRESS NOTES
"Subjective:       Patient ID: Vargas Lozano is a 32 y.o. male.    Chief Complaint: opioid dependence    HPI     The patient presents for medical management of opioid dependency. he is receiving maintenance therapy with buprenorphine.      CHIEF COMPLAINT: opoid dependence  HPI:     ONSET/TIMING:     DURATION: . Continuous(+).    QUALITY/COURSE:  unchanged.     INTENSITY/SEVERITY:  controlled.     CONTEXT/WHEN:     MODIFIERS/TREATMENTS:  Taking medications(+) Suboxone  8/2 # 60,   last rx given 3/26/18    SYMPTOMS/RELATED: no withdrawal symptoms. no cravings . No substance abuse.  No alcohol use     pnp checked: last month:  yes    Review of Systems   Constitutional: Negative for fatigue and unexpected weight change.   Cardiovascular: Negative for leg swelling.   Gastrointestinal: Negative for constipation, diarrhea and nausea.   Musculoskeletal: Negative for gait problem.   Neurological: Negative for dizziness, tremors and headaches.   Psychiatric/Behavioral: Negative for behavioral problems, dysphoric mood and sleep disturbance.       Objective:      Vitals:    06/21/18 1119   BP: 136/82   Pulse: 71   Resp: 16   Temp: 98 °F (36.7 °C)   TempSrc: Oral   SpO2: 99%   Weight: 89.1 kg (196 lb 6.9 oz)   Height: 5' 11" (1.803 m)   PainSc: 0-No pain     Physical Exam   Constitutional: He appears well-developed and well-nourished.   Eyes: Pupils are equal, round, and reactive to light.   Cardiovascular: Normal rate, regular rhythm and normal heart sounds.    Pulmonary/Chest: Effort normal and breath sounds normal.   Abdominal: Soft. There is no tenderness.   Neurological: He is alert.   Psychiatric: He has a normal mood and affect. His behavior is normal. Thought content normal.   Nursing note and vitals reviewed.          Urine drug screen negative except buprenorphine.       Assessment:       1. Narcotic dependence          Plan:   (+) pt taking medication as prescribed.    (+) dose is approproate.    (+) urine drug " screen done.   (+) discussed risks of buprenorphine, including to others.     (+) assessed if benefits outweigh risks of buprenorphine. .     (+) reviewed safe storage of medication.     (+) discussed proper use of buprenorphine, including missed doses.        Narcotic dependence  -     buprenorphine-naloxone (SUBOXONE) 8-2 mg Film; 1 sl bid  Dispense: 60 packet; Refill: 0      Follow-up in about 1 month (around 7/21/2018).

## 2018-07-18 ENCOUNTER — DOCUMENTATION ONLY (OUTPATIENT)
Dept: FAMILY MEDICINE | Facility: CLINIC | Age: 33
End: 2018-07-18

## 2018-07-19 ENCOUNTER — OFFICE VISIT (OUTPATIENT)
Dept: FAMILY MEDICINE | Facility: CLINIC | Age: 33
End: 2018-07-19
Payer: COMMERCIAL

## 2018-07-19 VITALS
HEIGHT: 71 IN | WEIGHT: 195.75 LBS | DIASTOLIC BLOOD PRESSURE: 70 MMHG | TEMPERATURE: 98 F | SYSTOLIC BLOOD PRESSURE: 122 MMHG | BODY MASS INDEX: 27.4 KG/M2 | RESPIRATION RATE: 16 BRPM | OXYGEN SATURATION: 99 % | HEART RATE: 73 BPM

## 2018-07-19 DIAGNOSIS — R68.82 LIBIDO, DECREASED: ICD-10-CM

## 2018-07-19 DIAGNOSIS — F11.20 NARCOTIC DEPENDENCE: Primary | ICD-10-CM

## 2018-07-19 PROCEDURE — 3008F BODY MASS INDEX DOCD: CPT | Mod: S$GLB,,, | Performed by: INTERNAL MEDICINE

## 2018-07-19 PROCEDURE — 99213 OFFICE O/P EST LOW 20 MIN: CPT | Mod: S$GLB,,, | Performed by: INTERNAL MEDICINE

## 2018-07-19 RX ORDER — BUPRENORPHINE AND NALOXONE 8; 2 MG/1; MG/1
FILM, SOLUBLE BUCCAL; SUBLINGUAL
Qty: 60 PACKET | Refills: 0 | Status: SHIPPED | OUTPATIENT
Start: 2018-07-19 | End: 2018-08-17 | Stop reason: SDUPTHER

## 2018-07-19 NOTE — PROGRESS NOTES
"Subjective:       Patient ID: Vargas Lozano is a 32 y.o. male.    Chief Complaint: opioid dependence    HPI     The patient presents for medical management of opioid dependency. he is receiving maintenance therapy with buprenorphine.      CHIEF COMPLAINT: opoid dependence  HPI:     ONSET/TIMING:     DURATION: . Continuous(+).    QUALITY/COURSE:  unchanged.     INTENSITY/SEVERITY:  controlled.     CONTEXT/WHEN:     MODIFIERS/TREATMENTS:  Taking medications(+) Suboxone  8/2 # 60,   last rx given 6/21/18   SYMPTOMS/RELATED: no withdrawal symptoms. no cravings . No substance abuse.  No alcohol use     pnp checked: last month:  Yes    Has markedly decreased libido.  His energy is good.    Review of Systems   Constitutional: Negative for fatigue and unexpected weight change.   Cardiovascular: Negative for leg swelling.   Gastrointestinal: Negative for constipation, diarrhea and nausea.   Musculoskeletal: Negative for gait problem.   Neurological: Negative for dizziness, tremors and headaches.   Psychiatric/Behavioral: Negative for behavioral problems, dysphoric mood and sleep disturbance.       Objective:      Vitals:    07/19/18 1105   BP: 122/70   Pulse: 73   Resp: 16   Temp: 98.1 °F (36.7 °C)   TempSrc: Oral   SpO2: 99%   Weight: 88.8 kg (195 lb 12.3 oz)   Height: 5' 11" (1.803 m)   PainSc: 0-No pain     Physical Exam   Constitutional: He appears well-developed and well-nourished.   Eyes: Pupils are equal, round, and reactive to light.   Cardiovascular: Normal rate, regular rhythm and normal heart sounds.    Pulmonary/Chest: Effort normal and breath sounds normal.   Abdominal: Soft. There is no tenderness.   Neurological: He is alert.   Psychiatric: He has a normal mood and affect. His behavior is normal. Thought content normal.   Nursing note and vitals reviewed.          Urine drug screen negative except buprenorphine.       Assessment:       1. Narcotic dependence    2. Libido, decreased          Plan:   (+) " pt taking medication as prescribed.    (+) dose is approproate.    (+) urine drug screen done.   (+) discussed risks of buprenorphine, including to others.     (+) assessed if benefits outweigh risks of buprenorphine. .     (+) reviewed safe storage of medication.     (+) discussed proper use of buprenorphine, including missed doses.        Narcotic dependence  -     buprenorphine-naloxone (SUBOXONE) 8-2 mg Film; 1 sl bid  Dispense: 60 packet; Refill: 0    Libido, decreased  -     Testosterone; Future; Expected date: 07/19/2018      Follow-up in about 1 month (around 8/19/2018).

## 2018-07-30 ENCOUNTER — TELEPHONE (OUTPATIENT)
Dept: FAMILY MEDICINE | Facility: CLINIC | Age: 33
End: 2018-07-30

## 2018-08-06 ENCOUNTER — LAB VISIT (OUTPATIENT)
Dept: LAB | Facility: HOSPITAL | Age: 33
End: 2018-08-06
Attending: INTERNAL MEDICINE
Payer: COMMERCIAL

## 2018-08-06 DIAGNOSIS — R68.82 LIBIDO, DECREASED: ICD-10-CM

## 2018-08-06 LAB — TESTOST SERPL-MCNC: 408 NG/DL

## 2018-08-06 PROCEDURE — 36415 COLL VENOUS BLD VENIPUNCTURE: CPT | Mod: PO

## 2018-08-06 PROCEDURE — 84403 ASSAY OF TOTAL TESTOSTERONE: CPT

## 2018-08-17 ENCOUNTER — OFFICE VISIT (OUTPATIENT)
Dept: FAMILY MEDICINE | Facility: CLINIC | Age: 33
End: 2018-08-17
Payer: COMMERCIAL

## 2018-08-17 VITALS
SYSTOLIC BLOOD PRESSURE: 106 MMHG | BODY MASS INDEX: 27.4 KG/M2 | HEART RATE: 54 BPM | WEIGHT: 195.75 LBS | OXYGEN SATURATION: 98 % | RESPIRATION RATE: 16 BRPM | TEMPERATURE: 98 F | HEIGHT: 71 IN | DIASTOLIC BLOOD PRESSURE: 68 MMHG

## 2018-08-17 DIAGNOSIS — F11.20 NARCOTIC DEPENDENCE: ICD-10-CM

## 2018-08-17 PROCEDURE — 99213 OFFICE O/P EST LOW 20 MIN: CPT | Mod: S$GLB,,, | Performed by: INTERNAL MEDICINE

## 2018-08-17 PROCEDURE — 3008F BODY MASS INDEX DOCD: CPT | Mod: S$GLB,,, | Performed by: INTERNAL MEDICINE

## 2018-08-17 RX ORDER — BUPRENORPHINE AND NALOXONE 8; 2 MG/1; MG/1
FILM, SOLUBLE BUCCAL; SUBLINGUAL
Qty: 40 PACKET | Refills: 0 | Status: SHIPPED | OUTPATIENT
Start: 2018-08-17 | End: 2018-09-11 | Stop reason: SDUPTHER

## 2018-08-17 NOTE — PROGRESS NOTES
"Subjective:       Patient ID: Vargas Lozano is a 32 y.o. male.    Chief Complaint: opioid dependence    HPI     The patient presents for medical management of opioid dependency. he is receiving maintenance therapy with buprenorphine.      CHIEF COMPLAINT: opoid dependence  HPI:     ONSET/TIMING:     DURATION: . Continuous(+).    QUALITY/COURSE:  unchanged.     INTENSITY/SEVERITY:  controlled.     CONTEXT/WHEN:     MODIFIERS/TREATMENTS:  Taking medications(+) Suboxone  8/2 # 60,   last rx given 6/21/18   SYMPTOMS/RELATED: no withdrawal symptoms. no cravings . No substance abuse.  No alcohol use     pnp checked: last month:  Yes    Has markedly decreased libido.  His energy is good.    Review of Systems   Constitutional: Negative for fatigue and unexpected weight change.   Cardiovascular: Negative for leg swelling.   Gastrointestinal: Negative for constipation, diarrhea and nausea.   Musculoskeletal: Negative for gait problem.   Neurological: Negative for dizziness, tremors and headaches.   Psychiatric/Behavioral: Negative for behavioral problems, dysphoric mood and sleep disturbance.       Objective:      Vitals:    08/17/18 1112   BP: 106/68   Pulse: (!) 54   Resp: 16   Temp: 97.6 °F (36.4 °C)   SpO2: 98%   Weight: 88.8 kg (195 lb 12.3 oz)   Height: 5' 11" (1.803 m)   PainSc: 0-No pain     Physical Exam   Constitutional: He appears well-developed and well-nourished.   Eyes: Pupils are equal, round, and reactive to light.   Cardiovascular: Normal rate, regular rhythm and normal heart sounds.   Pulmonary/Chest: Effort normal and breath sounds normal.   Abdominal: Soft. There is no tenderness.   Neurological: He is alert.   Psychiatric: He has a normal mood and affect. His behavior is normal. Thought content normal.   Nursing note and vitals reviewed.                Assessment:       1. Narcotic dependence          Plan:   (+) pt taking medication as prescribed.    (+) dose is approproate.    (-) urine drug " screen done. Will give pt 2/3 of rx for suboxone.     (+) discussed risks of buprenorphine, including to others.     (+) assessed if benefits outweigh risks of buprenorphine. .     (+) reviewed safe storage of medication.     (+) discussed proper use of buprenorphine, including missed doses.          Narcotic dependence  -     buprenorphine-naloxone (SUBOXONE) 8-2 mg Film; 1 sl bid  Dispense: 40 packet; Refill: 0      Follow-up in about 1 month (around 9/17/2018).

## 2018-08-27 ENCOUNTER — TELEPHONE (OUTPATIENT)
Dept: FAMILY MEDICINE | Facility: CLINIC | Age: 33
End: 2018-08-27

## 2018-09-04 ENCOUNTER — TELEPHONE (OUTPATIENT)
Dept: FAMILY MEDICINE | Facility: CLINIC | Age: 33
End: 2018-09-04

## 2018-09-04 NOTE — TELEPHONE ENCOUNTER
----- Message from Elina Chapman sent at 9/4/2018  9:27 AM CDT -----  Contact: patient   Patient requesting a call back. Please advise.   Call back   Thanks!

## 2018-09-04 NOTE — TELEPHONE ENCOUNTER
Patient was in clinic.  Came in for drug screen.   Deadline missed.  Called on 8/27.  Instructed to talk with Dr Dolan at next appointment.

## 2018-09-11 ENCOUNTER — OFFICE VISIT (OUTPATIENT)
Dept: FAMILY MEDICINE | Facility: CLINIC | Age: 33
End: 2018-09-11
Payer: COMMERCIAL

## 2018-09-11 VITALS
BODY MASS INDEX: 27.66 KG/M2 | HEART RATE: 74 BPM | OXYGEN SATURATION: 97 % | DIASTOLIC BLOOD PRESSURE: 80 MMHG | WEIGHT: 197.56 LBS | HEIGHT: 71 IN | TEMPERATURE: 98 F | SYSTOLIC BLOOD PRESSURE: 128 MMHG

## 2018-09-11 DIAGNOSIS — F11.20 NARCOTIC DEPENDENCE: ICD-10-CM

## 2018-09-11 PROCEDURE — 99213 OFFICE O/P EST LOW 20 MIN: CPT | Mod: S$GLB,,, | Performed by: INTERNAL MEDICINE

## 2018-09-11 PROCEDURE — 3008F BODY MASS INDEX DOCD: CPT | Mod: S$GLB,,, | Performed by: INTERNAL MEDICINE

## 2018-09-11 RX ORDER — BUPRENORPHINE AND NALOXONE 8; 2 MG/1; MG/1
FILM, SOLUBLE BUCCAL; SUBLINGUAL
Qty: 40 PACKET | Refills: 0 | Status: SHIPPED | OUTPATIENT
Start: 2018-09-11 | End: 2018-09-21 | Stop reason: SDUPTHER

## 2018-09-11 NOTE — PROGRESS NOTES
"Subjective:       Patient ID: Vargas Lozano is a 32 y.o. male.    Chief Complaint: Narcotic Dependence    HPI     The patient presents for medical management of opioid dependency. he is receiving maintenance therapy with buprenorphine.      CHIEF COMPLAINT: opoid dependence  HPI:     ONSET/TIMING:     DURATION: . Continuous(+).    QUALITY/COURSE:  unchanged.     INTENSITY/SEVERITY:  controlled.     CONTEXT/WHEN:     MODIFIERS/TREATMENTS:  Taking medications(+) Suboxone  8/2 # 40,   last rx given 8/17/18   SYMPTOMS/RELATED: no withdrawal symptoms. no cravings . No substance abuse.  No alcohol use     pnp checked: last month:  Yes    Has markedly decreased libido.  His energy is good.    Review of Systems   Constitutional: Negative for fatigue and unexpected weight change.   Cardiovascular: Negative for leg swelling.   Gastrointestinal: Negative for constipation, diarrhea and nausea.   Musculoskeletal: Negative for gait problem.   Neurological: Negative for dizziness, tremors and headaches.   Psychiatric/Behavioral: Negative for behavioral problems, dysphoric mood and sleep disturbance.       Objective:      Vitals:    09/11/18 1153   BP: 128/80   Pulse: 74   Temp: 97.6 °F (36.4 °C)   TempSrc: Oral   SpO2: 97%   Weight: 89.6 kg (197 lb 8.5 oz)   Height: 5' 11" (1.803 m)   PainSc: 0-No pain     Physical Exam   Constitutional: He appears well-developed and well-nourished.   Eyes: Pupils are equal, round, and reactive to light.   Cardiovascular: Normal rate, regular rhythm and normal heart sounds.   Pulmonary/Chest: Effort normal and breath sounds normal.   Abdominal: Soft. There is no tenderness.   Neurological: He is alert.   Psychiatric: He has a normal mood and affect. His behavior is normal. Thought content normal.   Nursing note and vitals reviewed.                Assessment:       1. Narcotic dependence          Plan:   (+) pt taking medication as prescribed.    (+) dose is approproate.    (-) urine drug " screen done. Will give pt 2/3 of rx for suboxone.     (+) discussed risks of buprenorphine, including to others.     (+) assessed if benefits outweigh risks of buprenorphine. .     (+) reviewed safe storage of medication.     (+) discussed proper use of buprenorphine, including missed doses.          Narcotic dependence  -     buprenorphine-naloxone (SUBOXONE) 8-2 mg Film; 1 sl bid  Dispense: 40 packet; Refill: 0      Follow-up in about 1 month (around 10/11/2018).

## 2018-09-19 ENCOUNTER — TELEPHONE (OUTPATIENT)
Dept: FAMILY MEDICINE | Facility: CLINIC | Age: 33
End: 2018-09-19

## 2018-09-20 ENCOUNTER — CLINICAL SUPPORT (OUTPATIENT)
Dept: FAMILY MEDICINE | Facility: CLINIC | Age: 33
End: 2018-09-20
Payer: COMMERCIAL

## 2018-09-20 DIAGNOSIS — F11.20 NARCOTIC DEPENDENCE: Primary | ICD-10-CM

## 2018-09-20 PROCEDURE — 80305 DRUG TEST PRSMV DIR OPT OBS: CPT | Mod: QW,S$GLB,, | Performed by: INTERNAL MEDICINE

## 2018-09-21 ENCOUNTER — TELEPHONE (OUTPATIENT)
Dept: FAMILY MEDICINE | Facility: CLINIC | Age: 33
End: 2018-09-21

## 2018-09-21 DIAGNOSIS — F11.20 NARCOTIC DEPENDENCE: ICD-10-CM

## 2018-09-21 RX ORDER — BUPRENORPHINE AND NALOXONE 8; 2 MG/1; MG/1
FILM, SOLUBLE BUCCAL; SUBLINGUAL
Qty: 20 PACKET | Refills: 0 | Status: SHIPPED | OUTPATIENT
Start: 2018-09-21 | End: 2018-10-16 | Stop reason: SDUPTHER

## 2018-10-04 ENCOUNTER — DOCUMENTATION ONLY (OUTPATIENT)
Dept: FAMILY MEDICINE | Facility: CLINIC | Age: 33
End: 2018-10-04

## 2018-10-04 NOTE — PROGRESS NOTES
Health Maintenance Due   Topic Date Due    Lipid Panel  1983    Pneumococcal PPSV23 (Medium Risk) (1) 11/16/2001    Influenza Vaccine  08/01/2018

## 2018-10-09 ENCOUNTER — TELEPHONE (OUTPATIENT)
Dept: FAMILY MEDICINE | Facility: CLINIC | Age: 33
End: 2018-10-09

## 2018-10-15 ENCOUNTER — TELEPHONE (OUTPATIENT)
Dept: FAMILY MEDICINE | Facility: CLINIC | Age: 33
End: 2018-10-15

## 2018-10-15 NOTE — TELEPHONE ENCOUNTER
----- Message from Esther Ramos sent at 10/15/2018  8:51 AM CDT -----  Contact: self   Patient need a refill on suboxone please send to [facility], any questions please call back at [phone] (home)     [facility] [id number]  [place], [place] - [address] AT [facility] [address]  [address]  [place] [address]  Phone: [phone] Fax: [phone]

## 2018-10-16 ENCOUNTER — DOCUMENTATION ONLY (OUTPATIENT)
Dept: FAMILY MEDICINE | Facility: CLINIC | Age: 33
End: 2018-10-16

## 2018-10-16 ENCOUNTER — OFFICE VISIT (OUTPATIENT)
Dept: FAMILY MEDICINE | Facility: CLINIC | Age: 33
End: 2018-10-16
Payer: COMMERCIAL

## 2018-10-16 VITALS
WEIGHT: 199.06 LBS | HEIGHT: 71 IN | HEART RATE: 76 BPM | BODY MASS INDEX: 27.87 KG/M2 | SYSTOLIC BLOOD PRESSURE: 118 MMHG | TEMPERATURE: 98 F | DIASTOLIC BLOOD PRESSURE: 82 MMHG | OXYGEN SATURATION: 98 % | RESPIRATION RATE: 16 BRPM

## 2018-10-16 DIAGNOSIS — F11.20 NARCOTIC DEPENDENCE: ICD-10-CM

## 2018-10-16 PROCEDURE — 99213 OFFICE O/P EST LOW 20 MIN: CPT | Mod: S$GLB,,, | Performed by: INTERNAL MEDICINE

## 2018-10-16 PROCEDURE — 3008F BODY MASS INDEX DOCD: CPT | Mod: S$GLB,,, | Performed by: INTERNAL MEDICINE

## 2018-10-16 RX ORDER — BUPRENORPHINE AND NALOXONE 8; 2 MG/1; MG/1
FILM, SOLUBLE BUCCAL; SUBLINGUAL
Qty: 60 PACKET | Refills: 0 | Status: SHIPPED | OUTPATIENT
Start: 2018-10-16 | End: 2018-11-19 | Stop reason: SDUPTHER

## 2018-10-16 NOTE — PROGRESS NOTES
"Subjective:       Patient ID: Vargas Lozano is a 32 y.o. male.    Chief Complaint: opioid dependence    HPI     The patient presents for medical management of opioid dependency. he is receiving maintenance therapy with buprenorphine.      CHIEF COMPLAINT: opoid dependence  HPI:     ONSET/TIMING:     DURATION: . Continuous(+).    QUALITY/COURSE:  unchanged.     INTENSITY/SEVERITY:  controlled.     CONTEXT/WHEN:     MODIFIERS/TREATMENTS:  Taking medications(+) Suboxone  8/2 # 40,   last rx given 9/11/18   SYMPTOMS/RELATED: no withdrawal symptoms. no cravings . No substance abuse.  No alcohol use     pnp checked: last month:  Yes    Has markedly decreased libido.  His energy is good.    Review of Systems   Constitutional: Negative for fatigue and unexpected weight change.   Cardiovascular: Negative for leg swelling.   Gastrointestinal: Negative for constipation, diarrhea and nausea.   Musculoskeletal: Negative for gait problem.   Neurological: Negative for dizziness, tremors and headaches.   Psychiatric/Behavioral: Negative for behavioral problems, dysphoric mood and sleep disturbance.       Objective:      Vitals:    10/16/18 0815   BP: 118/82   Pulse: 76   Resp: 16   Temp: 97.8 °F (36.6 °C)   TempSrc: Oral   SpO2: 98%   Weight: 90.3 kg (199 lb 1.2 oz)   Height: 5' 11" (1.803 m)   PainSc: 0-No pain     Physical Exam   Constitutional: He appears well-developed and well-nourished.   Eyes: Pupils are equal, round, and reactive to light.   Cardiovascular: Normal rate, regular rhythm and normal heart sounds.   Pulmonary/Chest: Effort normal and breath sounds normal.   Abdominal: Soft. There is no tenderness.   Neurological: He is alert.   Psychiatric: He has a normal mood and affect. His behavior is normal. Thought content normal.   Nursing note and vitals reviewed.                Assessment:       1. Narcotic dependence          Plan:   (+) pt taking medication as prescribed.    (+) dose is approproate.    (+) " urine drug screen done.   (+) discussed risks of buprenorphine, including to others.     (+) assessed if benefits outweigh risks of buprenorphine. .     (+) reviewed safe storage of medication.     (+) discussed proper use of buprenorphine, including missed doses.            Narcotic dependence  -     buprenorphine-naloxone (SUBOXONE) 8-2 mg Film; 1 sl bid  Dispense: 60 packet; Refill: 0      Follow-up in about 1 month (around 11/16/2018).

## 2018-11-14 ENCOUNTER — TELEPHONE (OUTPATIENT)
Dept: FAMILY MEDICINE | Facility: CLINIC | Age: 33
End: 2018-11-14

## 2018-11-19 ENCOUNTER — DOCUMENTATION ONLY (OUTPATIENT)
Dept: FAMILY MEDICINE | Facility: CLINIC | Age: 33
End: 2018-11-19

## 2018-11-19 ENCOUNTER — OFFICE VISIT (OUTPATIENT)
Dept: FAMILY MEDICINE | Facility: CLINIC | Age: 33
End: 2018-11-19
Payer: COMMERCIAL

## 2018-11-19 VITALS
WEIGHT: 204.38 LBS | OXYGEN SATURATION: 98 % | HEIGHT: 71 IN | RESPIRATION RATE: 16 BRPM | SYSTOLIC BLOOD PRESSURE: 136 MMHG | BODY MASS INDEX: 28.61 KG/M2 | HEART RATE: 81 BPM | DIASTOLIC BLOOD PRESSURE: 72 MMHG

## 2018-11-19 DIAGNOSIS — F11.20 NARCOTIC DEPENDENCE: ICD-10-CM

## 2018-11-19 PROCEDURE — 99213 OFFICE O/P EST LOW 20 MIN: CPT | Mod: S$GLB,,, | Performed by: INTERNAL MEDICINE

## 2018-11-19 PROCEDURE — 3008F BODY MASS INDEX DOCD: CPT | Mod: S$GLB,,, | Performed by: INTERNAL MEDICINE

## 2018-11-19 RX ORDER — BUPRENORPHINE AND NALOXONE 8; 2 MG/1; MG/1
FILM, SOLUBLE BUCCAL; SUBLINGUAL
Qty: 60 PACKET | Refills: 0 | Status: SHIPPED | OUTPATIENT
Start: 2018-11-19 | End: 2018-12-19 | Stop reason: SDUPTHER

## 2018-11-19 NOTE — PROGRESS NOTES
"Subjective:       Patient ID: Vargas Lozano is a 33 y.o. male.    Chief Complaint: opioid dependence    HPI     The patient presents for medical management of opioid dependency. he is receiving maintenance therapy with buprenorphine.      CHIEF COMPLAINT: opoid dependence  HPI:     ONSET/TIMING:     DURATION: . Continuous(+).    QUALITY/COURSE:  unchanged.     INTENSITY/SEVERITY:  controlled.     CONTEXT/WHEN:     MODIFIERS/TREATMENTS:  Taking medications(+) Suboxone  8/2 # 60,   last rx given 10/16/18    SYMPTOMS/RELATED: no withdrawal symptoms. no cravings . No substance abuse.  No alcohol use     pnp checked: last month:  Yes    Has markedly decreased libido.  His energy is good.    Review of Systems   Constitutional: Negative for fatigue and unexpected weight change.   Cardiovascular: Negative for leg swelling.   Gastrointestinal: Negative for constipation, diarrhea and nausea.   Musculoskeletal: Negative for gait problem.   Neurological: Negative for dizziness, tremors and headaches.   Psychiatric/Behavioral: Negative for behavioral problems, dysphoric mood and sleep disturbance.       Objective:      Vitals:    11/19/18 1342   BP: 136/72   Pulse: 81   Resp: 16   SpO2: 98%   Weight: 92.7 kg (204 lb 5.9 oz)   Height: 5' 11" (1.803 m)   PainSc: 0-No pain     Physical Exam   Constitutional: He appears well-developed and well-nourished.   Eyes: Pupils are equal, round, and reactive to light.   Cardiovascular: Normal rate, regular rhythm and normal heart sounds.   Pulmonary/Chest: Effort normal and breath sounds normal.   Abdominal: Soft. There is no tenderness.   Neurological: He is alert.   Psychiatric: He has a normal mood and affect. His behavior is normal. Thought content normal.   Nursing note and vitals reviewed.                Assessment:       1. Narcotic dependence          Plan:   (+) pt taking medication as prescribed.    (+) dose is approproate.    (-) urine drug screen done.  We ran out of " strips so was our fault.   (+) discussed risks of buprenorphine, including to others.     (+) assessed if benefits outweigh risks of buprenorphine. .     (+) reviewed safe storage of medication.     (+) discussed proper use of buprenorphine, including missed doses.              Narcotic dependence  -     buprenorphine-naloxone (SUBOXONE) 8-2 mg Film; 1 sl bid  Dispense: 60 packet; Refill: 0      Follow-up in about 1 month (around 12/19/2018).

## 2018-12-13 ENCOUNTER — TELEPHONE (OUTPATIENT)
Dept: FAMILY MEDICINE | Facility: CLINIC | Age: 33
End: 2018-12-13

## 2018-12-14 ENCOUNTER — CLINICAL SUPPORT (OUTPATIENT)
Dept: FAMILY MEDICINE | Facility: CLINIC | Age: 33
End: 2018-12-14
Payer: COMMERCIAL

## 2018-12-14 DIAGNOSIS — F11.20 NARCOTIC DEPENDENCE: Primary | ICD-10-CM

## 2018-12-14 PROCEDURE — 80305 DRUG TEST PRSMV DIR OPT OBS: CPT | Mod: QW,S$GLB,, | Performed by: INTERNAL MEDICINE

## 2018-12-17 ENCOUNTER — TELEPHONE (OUTPATIENT)
Dept: FAMILY MEDICINE | Facility: CLINIC | Age: 33
End: 2018-12-17

## 2018-12-17 NOTE — TELEPHONE ENCOUNTER
----- Message from Bobbi Granado sent at 12/17/2018  9:36 AM CST -----  Contact: Patient  Type:  Same Day Appointment Request    Caller is requesting a same day appointment.  Caller declined first available appointment listed below.      Name of Caller:  Patient  When is the first available appointment?  12/19  Symptoms:  Lump on lower region  Best Call Back Number:  [phone] (home)    Additional Information:   na

## 2018-12-19 ENCOUNTER — OFFICE VISIT (OUTPATIENT)
Dept: FAMILY MEDICINE | Facility: CLINIC | Age: 33
End: 2018-12-19
Payer: COMMERCIAL

## 2018-12-19 VITALS
WEIGHT: 203.63 LBS | RESPIRATION RATE: 16 BRPM | HEART RATE: 66 BPM | SYSTOLIC BLOOD PRESSURE: 120 MMHG | DIASTOLIC BLOOD PRESSURE: 76 MMHG | BODY MASS INDEX: 28.51 KG/M2 | OXYGEN SATURATION: 97 % | HEIGHT: 71 IN

## 2018-12-19 DIAGNOSIS — F11.20 NARCOTIC DEPENDENCE: ICD-10-CM

## 2018-12-19 DIAGNOSIS — N43.40 SPERMATOCELE: Primary | ICD-10-CM

## 2018-12-19 PROCEDURE — 3008F BODY MASS INDEX DOCD: CPT | Mod: S$GLB,,, | Performed by: INTERNAL MEDICINE

## 2018-12-19 PROCEDURE — 99213 OFFICE O/P EST LOW 20 MIN: CPT | Mod: S$GLB,,, | Performed by: INTERNAL MEDICINE

## 2018-12-19 RX ORDER — DOXYCYCLINE 100 MG/1
100 CAPSULE ORAL EVERY 12 HOURS
Qty: 20 CAPSULE | Refills: 0 | Status: SHIPPED | OUTPATIENT
Start: 2018-12-19 | End: 2019-01-17 | Stop reason: ALTCHOICE

## 2018-12-19 RX ORDER — BUPRENORPHINE AND NALOXONE 8; 2 MG/1; MG/1
FILM, SOLUBLE BUCCAL; SUBLINGUAL
Qty: 60 PACKET | Refills: 0 | Status: SHIPPED | OUTPATIENT
Start: 2018-12-19 | End: 2019-01-17 | Stop reason: SDUPTHER

## 2018-12-19 NOTE — PROGRESS NOTES
"Subjective:       Patient ID: Vargas Lozano is a 33 y.o. male.    Chief Complaint: opioid dependence    HPI     The patient presents for medical management of opioid dependency. he is receiving maintenance therapy with buprenorphine.      CHIEF COMPLAINT: opoid dependence  HPI:     ONSET/TIMING:     DURATION: . Continuous(+).    QUALITY/COURSE:  unchanged.     INTENSITY/SEVERITY:  controlled.     CONTEXT/WHEN:     MODIFIERS/TREATMENTS:  Taking medications(+) Suboxone  8/2 # 60,   last rx given 11/19/18    SYMPTOMS/RELATED: no withdrawal symptoms. no cravings . No substance abuse.  No alcohol use     pnp checked: last month:  Yes        2 days ago the patient noticed the swelling on the left upper scrotum.  2/10 pain only if touch.  No fever.  No dysuria or frequency.    Review of Systems   Constitutional: Negative for fatigue and unexpected weight change.   Cardiovascular: Negative for leg swelling.   Gastrointestinal: Negative for constipation, diarrhea and nausea.   Musculoskeletal: Negative for gait problem.   Neurological: Negative for dizziness, tremors and headaches.   Psychiatric/Behavioral: Negative for behavioral problems, dysphoric mood and sleep disturbance.       Objective:      Vitals:    12/19/18 0845   BP: 120/76   Pulse: 66   Resp: 16   SpO2: 97%   Weight: 92.4 kg (203 lb 9.6 oz)   Height: 5' 11" (1.803 m)   PainSc: 0-No pain     Physical Exam   Constitutional: He appears well-developed and well-nourished.   Eyes: Pupils are equal, round, and reactive to light.   Cardiovascular: Normal rate, regular rhythm and normal heart sounds.   Pulmonary/Chest: Effort normal and breath sounds normal.   Abdominal: Soft. There is no tenderness.   Genitourinary:         Neurological: He is alert.   Psychiatric: He has a normal mood and affect. His behavior is normal. Thought content normal.   Nursing note and vitals reviewed.        Urine drug screen negative except buprenorphine.           Assessment:     "   1. Spermatocele    2. Narcotic dependence          Plan:   (+) pt taking medication as prescribed.    (+) dose is approproate.    (+) urine drug screen done.   (+) discussed risks of buprenorphine, including to others.     (+) assessed if benefits outweigh risks of buprenorphine. .     (+) reviewed safe storage of medication.     (+) discussed proper use of buprenorphine, including missed doses.                Spermatocele  -     doxycycline (VIBRAMYCIN) 100 MG Cap; Take 1 capsule (100 mg total) by mouth every 12 (twelve) hours.  Dispense: 20 capsule; Refill: 0    Narcotic dependence  -     buprenorphine-naloxone (SUBOXONE) 8-2 mg Film; 1 sl bid  Dispense: 60 packet; Refill: 0      Follow-up in about 1 month (around 1/19/2019).

## 2019-01-17 ENCOUNTER — DOCUMENTATION ONLY (OUTPATIENT)
Dept: FAMILY MEDICINE | Facility: CLINIC | Age: 34
End: 2019-01-17

## 2019-01-17 ENCOUNTER — OFFICE VISIT (OUTPATIENT)
Dept: FAMILY MEDICINE | Facility: CLINIC | Age: 34
End: 2019-01-17
Payer: COMMERCIAL

## 2019-01-17 VITALS
RESPIRATION RATE: 16 BRPM | SYSTOLIC BLOOD PRESSURE: 108 MMHG | HEART RATE: 65 BPM | OXYGEN SATURATION: 97 % | BODY MASS INDEX: 28.45 KG/M2 | HEIGHT: 71 IN | DIASTOLIC BLOOD PRESSURE: 60 MMHG | WEIGHT: 203.25 LBS

## 2019-01-17 DIAGNOSIS — F11.20 NARCOTIC DEPENDENCE: ICD-10-CM

## 2019-01-17 PROCEDURE — 3008F PR BODY MASS INDEX (BMI) DOCUMENTED: ICD-10-PCS | Mod: S$GLB,,, | Performed by: INTERNAL MEDICINE

## 2019-01-17 PROCEDURE — 99213 PR OFFICE/OUTPT VISIT, EST, LEVL III, 20-29 MIN: ICD-10-PCS | Mod: S$GLB,,, | Performed by: INTERNAL MEDICINE

## 2019-01-17 PROCEDURE — 3008F BODY MASS INDEX DOCD: CPT | Mod: S$GLB,,, | Performed by: INTERNAL MEDICINE

## 2019-01-17 PROCEDURE — 99213 OFFICE O/P EST LOW 20 MIN: CPT | Mod: S$GLB,,, | Performed by: INTERNAL MEDICINE

## 2019-01-17 RX ORDER — BUPRENORPHINE AND NALOXONE 8; 2 MG/1; MG/1
FILM, SOLUBLE BUCCAL; SUBLINGUAL
Qty: 60 PACKET | Refills: 0 | Status: SHIPPED | OUTPATIENT
Start: 2019-01-17 | End: 2019-02-15 | Stop reason: SDUPTHER

## 2019-01-17 NOTE — PROGRESS NOTES
Health Maintenance Due   Topic Date Due    Lipid Panel  1983    Pneumococcal Vaccine (Medium Risk) (1 of 1 - PPSV23) 11/16/2002    Influenza Vaccine  08/01/2018

## 2019-01-17 NOTE — PROGRESS NOTES
"Subjective:       Patient ID: Vargas Lozano is a 33 y.o. male.    Chief Complaint: opioid dependence    HPI     The patient presents for medical management of opioid dependency. he is receiving maintenance therapy with buprenorphine.      CHIEF COMPLAINT: opoid dependence  HPI:     ONSET/TIMING:     DURATION: . Continuous(+).    QUALITY/COURSE:  unchanged.     INTENSITY/SEVERITY:  controlled.     CONTEXT/WHEN:     MODIFIERS/TREATMENTS:  Taking medications(+) Suboxone  8/2 # 60,   last rx given 11/19/18    SYMPTOMS/RELATED: no withdrawal symptoms. no cravings . No substance abuse.  No alcohol use     pnp checked: last month:  Yes            Review of Systems   Constitutional: Negative for fatigue and unexpected weight change.   Cardiovascular: Negative for leg swelling.   Gastrointestinal: Negative for constipation, diarrhea and nausea.   Musculoskeletal: Negative for gait problem.   Neurological: Negative for dizziness, tremors and headaches.   Psychiatric/Behavioral: Negative for behavioral problems, dysphoric mood and sleep disturbance.       Objective:      Vitals:    01/17/19 0946   BP: 108/60   Pulse: 65   Resp: 16   SpO2: 97%   Weight: 92.2 kg (203 lb 4.2 oz)   Height: 5' 11" (1.803 m)   PainSc: 0-No pain     Physical Exam   Constitutional: He appears well-developed and well-nourished.   Cardiovascular: Normal rate, regular rhythm and normal heart sounds.   Pulmonary/Chest: Effort normal and breath sounds normal.   Abdominal: Soft. There is no tenderness.   Neurological: He is alert.   Psychiatric: He has a normal mood and affect. His behavior is normal. Thought content normal.   Nursing note and vitals reviewed.        Urine drug screen negative except buprenorphine.           Assessment:       No diagnosis found.      Plan:   (+) pt taking medication as prescribed.    (+) dose is approproate.    (+) urine drug screen done.   (+) discussed risks of buprenorphine, including to others.     (+) assessed " if benefits outweigh risks of buprenorphine. .     (+) reviewed safe storage of medication.     (+) discussed proper use of buprenorphine, including missed doses.                There are no diagnoses linked to this encounter.  No Follow-up on file.

## 2019-01-23 ENCOUNTER — TELEPHONE (OUTPATIENT)
Dept: FAMILY MEDICINE | Facility: CLINIC | Age: 34
End: 2019-01-23

## 2019-01-23 NOTE — TELEPHONE ENCOUNTER
Left voicemail for patient to return call to clinic by 4pm today. Notification of random drug screen.

## 2019-02-15 ENCOUNTER — OFFICE VISIT (OUTPATIENT)
Dept: FAMILY MEDICINE | Facility: CLINIC | Age: 34
End: 2019-02-15
Payer: COMMERCIAL

## 2019-02-15 ENCOUNTER — DOCUMENTATION ONLY (OUTPATIENT)
Dept: FAMILY MEDICINE | Facility: CLINIC | Age: 34
End: 2019-02-15

## 2019-02-15 VITALS
WEIGHT: 204.81 LBS | SYSTOLIC BLOOD PRESSURE: 122 MMHG | BODY MASS INDEX: 28.67 KG/M2 | OXYGEN SATURATION: 97 % | DIASTOLIC BLOOD PRESSURE: 60 MMHG | RESPIRATION RATE: 16 BRPM | HEIGHT: 71 IN | HEART RATE: 79 BPM

## 2019-02-15 DIAGNOSIS — N43.40 SPERMATOCELE: ICD-10-CM

## 2019-02-15 DIAGNOSIS — F11.20 NARCOTIC DEPENDENCE: Primary | ICD-10-CM

## 2019-02-15 PROCEDURE — 3008F BODY MASS INDEX DOCD: CPT | Mod: S$GLB,,, | Performed by: INTERNAL MEDICINE

## 2019-02-15 PROCEDURE — 99213 PR OFFICE/OUTPT VISIT, EST, LEVL III, 20-29 MIN: ICD-10-PCS | Mod: S$GLB,,, | Performed by: INTERNAL MEDICINE

## 2019-02-15 PROCEDURE — 3008F PR BODY MASS INDEX (BMI) DOCUMENTED: ICD-10-PCS | Mod: S$GLB,,, | Performed by: INTERNAL MEDICINE

## 2019-02-15 PROCEDURE — 99213 OFFICE O/P EST LOW 20 MIN: CPT | Mod: S$GLB,,, | Performed by: INTERNAL MEDICINE

## 2019-02-15 RX ORDER — DOXYCYCLINE 100 MG/1
100 CAPSULE ORAL EVERY 12 HOURS
Qty: 20 CAPSULE | Refills: 0 | Status: SHIPPED | OUTPATIENT
Start: 2019-02-15 | End: 2019-04-03 | Stop reason: ALTCHOICE

## 2019-02-15 RX ORDER — BUPRENORPHINE AND NALOXONE 8; 2 MG/1; MG/1
FILM, SOLUBLE BUCCAL; SUBLINGUAL
Qty: 40 PACKET | Refills: 0 | Status: SHIPPED | OUTPATIENT
Start: 2019-02-15 | End: 2019-03-06 | Stop reason: SDUPTHER

## 2019-02-15 NOTE — PROGRESS NOTES
"For Subjective:       Patient ID: Vargas Lozano is a 33 y.o. male.    Chief Complaint: opioid dependence    HPI   The patient presents for medical management of opioid dependency. he is receiving maintenance therapy with buprenorphine.      CHIEF COMPLAINT: opoid dependence  HPI:     ONSET/TIMING:     DURATION: . Continuous(+).    QUALITY/COURSE:  unchanged.     INTENSITY/SEVERITY:  controlled.     CONTEXT/WHEN:     MODIFIERS/TREATMENTS:  Taking medications(+) Suboxone  8/2 # 60,   last rx given 1/17/19    SYMPTOMS/RELATED: no withdrawal symptoms. no cravings . No substance abuse.  No alcohol use     pnp checked: last month:  Yes            Review of Systems   Constitutional: Negative for fatigue and unexpected weight change.   Cardiovascular: Negative for leg swelling.   Gastrointestinal: Negative for constipation, diarrhea and nausea.   Musculoskeletal: Negative for gait problem.   Neurological: Negative for dizziness, tremors and headaches.   Psychiatric/Behavioral: Negative for behavioral problems, dysphoric mood and sleep disturbance.       Objective:      Vitals:    02/15/19 0927   BP: 122/60   Pulse: 79   Resp: 16   SpO2: 97%   Weight: 92.9 kg (204 lb 12.9 oz)   Height: 5' 11" (1.803 m)   PainSc: 0-No pain     Physical Exam   Constitutional: He appears well-developed and well-nourished.   Cardiovascular: Normal rate, regular rhythm and normal heart sounds.   Pulmonary/Chest: Effort normal and breath sounds normal.   Abdominal: Soft. There is no tenderness.   Genitourinary:   Genitourinary Comments: 2 and 0.5 cm inflamed tender S stretches of left vas deferens   Neurological: He is alert.   Psychiatric: He has a normal mood and affect. His behavior is normal. Thought content normal.   Nursing note and vitals reviewed.               Assessment:       1. Narcotic dependence    2. Spermatocele          Plan:   (+) pt taking medication as prescribed.    (+) dose is approproate.    (-) urine drug screen " done. Will give pt 2/3 of rx for suboxone.     (+) discussed risks of buprenorphine, including to others.     (+) assessed if benefits outweigh risks of buprenorphine. .     (+) reviewed safe storage of medication.     (+) discussed proper use of buprenorphine, including missed doses.                  Narcotic dependence  -     buprenorphine-naloxone (SUBOXONE) 8-2 mg Film; 1 sl bid  Dispense: 40 packet; Refill: 0  -     Ambulatory consult to Urology    Spermatocele    Other orders  -     doxycycline (VIBRAMYCIN) 100 MG Cap; Take 1 capsule (100 mg total) by mouth every 12 (twelve) hours.  Dispense: 20 capsule; Refill: 0      Follow-up in about 1 month (around 3/15/2019).

## 2019-02-19 ENCOUNTER — TELEPHONE (OUTPATIENT)
Dept: FAMILY MEDICINE | Facility: CLINIC | Age: 34
End: 2019-02-19

## 2019-02-20 NOTE — PATIENT INSTRUCTIONS
An order for a urine drug screen with buprenorphine was given.  The patient is to use the order when called, on a random day.   Testosterone level must be drawn at 8 a.m.  
4

## 2019-03-04 ENCOUNTER — TELEPHONE (OUTPATIENT)
Dept: FAMILY MEDICINE | Facility: CLINIC | Age: 34
End: 2019-03-04

## 2019-03-04 NOTE — TELEPHONE ENCOUNTER
----- Message from Karl Gallego sent at 3/4/2019  2:55 PM CST -----  Contact: Pt Vargas  Type:  Sooner Apoointment Request    Caller is requesting a sooner appointment.  Caller declined first available appointment listed below.  Caller will not accept being placed on the waitlist and is requesting a message be sent to doctor.    Name of Caller:  Luis Manuel  When is the first available appointment?  March 11, 2019  Symptoms:  n/a  Best Call Back Number:  [phone]  Additional Information:  Pt advd he needs to have drug screen monthly and is past due, due to being out of town /req to see if he can get in before 03/11/19

## 2019-03-06 ENCOUNTER — OFFICE VISIT (OUTPATIENT)
Dept: FAMILY MEDICINE | Facility: CLINIC | Age: 34
End: 2019-03-06
Payer: COMMERCIAL

## 2019-03-06 ENCOUNTER — PATIENT MESSAGE (OUTPATIENT)
Dept: FAMILY MEDICINE | Facility: CLINIC | Age: 34
End: 2019-03-06

## 2019-03-06 ENCOUNTER — DOCUMENTATION ONLY (OUTPATIENT)
Dept: FAMILY MEDICINE | Facility: CLINIC | Age: 34
End: 2019-03-06

## 2019-03-06 VITALS
OXYGEN SATURATION: 97 % | WEIGHT: 205 LBS | DIASTOLIC BLOOD PRESSURE: 70 MMHG | RESPIRATION RATE: 16 BRPM | HEART RATE: 82 BPM | SYSTOLIC BLOOD PRESSURE: 116 MMHG | HEIGHT: 71 IN | BODY MASS INDEX: 28.7 KG/M2

## 2019-03-06 DIAGNOSIS — F11.20 NARCOTIC DEPENDENCE: ICD-10-CM

## 2019-03-06 PROCEDURE — 80305 DRUG TEST PRSMV DIR OPT OBS: CPT | Mod: QW,S$GLB,, | Performed by: INTERNAL MEDICINE

## 2019-03-06 PROCEDURE — 3008F BODY MASS INDEX DOCD: CPT | Mod: S$GLB,,, | Performed by: INTERNAL MEDICINE

## 2019-03-06 PROCEDURE — 3008F PR BODY MASS INDEX (BMI) DOCUMENTED: ICD-10-PCS | Mod: S$GLB,,, | Performed by: INTERNAL MEDICINE

## 2019-03-06 PROCEDURE — 99213 OFFICE O/P EST LOW 20 MIN: CPT | Mod: S$GLB,,, | Performed by: INTERNAL MEDICINE

## 2019-03-06 PROCEDURE — 99213 PR OFFICE/OUTPT VISIT, EST, LEVL III, 20-29 MIN: ICD-10-PCS | Mod: S$GLB,,, | Performed by: INTERNAL MEDICINE

## 2019-03-06 PROCEDURE — 80305 POCT BUP URINE DRUG TEST: ICD-10-PCS | Mod: QW,S$GLB,, | Performed by: INTERNAL MEDICINE

## 2019-03-06 RX ORDER — BUPRENORPHINE AND NALOXONE 8; 2 MG/1; MG/1
FILM, SOLUBLE BUCCAL; SUBLINGUAL
Qty: 40 PACKET | Refills: 0 | Status: SHIPPED | OUTPATIENT
Start: 2019-03-06 | End: 2019-03-22 | Stop reason: SDUPTHER

## 2019-03-06 NOTE — PROGRESS NOTES
"Subjective:       Patient ID: Vargas Lozano is a 33 y.o. male.    Chief Complaint: opioid dependence    HPI   HPI   The patient presents for medical management of opioid dependency. he is receiving maintenance therapy with buprenorphine.      CHIEF COMPLAINT: opoid dependence  HPI:     ONSET/TIMING:     DURATION: . Continuous(+).    QUALITY/COURSE:  unchanged.     INTENSITY/SEVERITY:  controlled.     CONTEXT/WHEN:     MODIFIERS/TREATMENTS:  Taking medications(+) Suboxone  8/2 # 40,   last rx given 2/15/19    SYMPTOMS/RELATED: no withdrawal symptoms. no cravings . No substance abuse.  No alcohol use     pnp checked: last month:  Yes            Review of Systems   Constitutional: Negative for fatigue and unexpected weight change.   Cardiovascular: Negative for leg swelling.   Gastrointestinal: Negative for constipation, diarrhea and nausea.   Musculoskeletal: Negative for gait problem.   Neurological: Negative for dizziness, tremors and headaches.   Psychiatric/Behavioral: Negative for behavioral problems, dysphoric mood and sleep disturbance.   Review of Systems      Objective:      Vitals:    03/06/19 1434   BP: 116/70   Pulse: 82   Resp: 16   SpO2: 97%   Weight: 93 kg (205 lb 0.4 oz)   Height: 5' 11" (1.803 m)   PainSc: 0-No pain     Physical Exam   Constitutional: He appears well-developed and well-nourished.   Cardiovascular: Normal rate, regular rhythm and normal heart sounds.   Pulmonary/Chest: Effort normal and breath sounds normal.   Abdominal: Soft. There is no tenderness.   Neurological: He is alert.   Psychiatric: He has a normal mood and affect. His behavior is normal. Thought content normal.   Nursing note and vitals reviewed.      no drug screen since December.  Urine drug screen negative except buprenorphine done non-random here.   Assessment:       1. Narcotic dependence          Plan:   (+) pt taking medication as prescribed.    (+) dose is approproate.    (-) urine drug screen done. Will give " pt 2/3 of rx for suboxone.     (+) discussed risks of buprenorphine, including to others.     (+) assessed if benefits outweigh risks of buprenorphine. .     (+) reviewed safe storage of medication.     (+) discussed proper use of buprenorphine, including missed doses.     Narcotic dependence  -     POCT [facility] Urine Drug Test; Future; Expected date: 03/06/2019  -     buprenorphine-naloxone (SUBOXONE) 8-2 mg Film; 1 sl bid  Dispense: 40 packet; Refill: 0      Follow-up in about 1 month (around 4/6/2019).

## 2019-03-06 NOTE — PROGRESS NOTES
Subjective:       Patient ID: Vargas Lozano is a 33 y.o. male.    Chief Complaint: opioid dependence    HPI  Review of Systems      Objective:      There were no vitals filed for this visit.  Physical Exam      Assessment:       No diagnosis found.      Plan:       There are no diagnoses linked to this encounter.  No Follow-up on file.

## 2019-03-11 ENCOUNTER — DOCUMENTATION ONLY (OUTPATIENT)
Dept: FAMILY MEDICINE | Facility: CLINIC | Age: 34
End: 2019-03-11

## 2019-03-21 ENCOUNTER — PATIENT MESSAGE (OUTPATIENT)
Dept: FAMILY MEDICINE | Facility: CLINIC | Age: 34
End: 2019-03-21

## 2019-03-22 ENCOUNTER — CLINICAL SUPPORT (OUTPATIENT)
Dept: FAMILY MEDICINE | Facility: CLINIC | Age: 34
End: 2019-03-22
Payer: COMMERCIAL

## 2019-03-22 ENCOUNTER — TELEPHONE (OUTPATIENT)
Dept: FAMILY MEDICINE | Facility: CLINIC | Age: 34
End: 2019-03-22

## 2019-03-22 DIAGNOSIS — F11.20 NARCOTIC DEPENDENCE: Primary | ICD-10-CM

## 2019-03-22 DIAGNOSIS — F11.20 NARCOTIC DEPENDENCE: ICD-10-CM

## 2019-03-22 PROCEDURE — 80305 POCT BUP URINE DRUG TEST: ICD-10-PCS | Mod: QW,S$GLB,, | Performed by: INTERNAL MEDICINE

## 2019-03-22 PROCEDURE — 80305 DRUG TEST PRSMV DIR OPT OBS: CPT | Mod: QW,S$GLB,, | Performed by: INTERNAL MEDICINE

## 2019-03-22 RX ORDER — BUPRENORPHINE AND NALOXONE 8; 2 MG/1; MG/1
FILM, SOLUBLE BUCCAL; SUBLINGUAL
Qty: 20 PACKET | Refills: 0 | Status: SHIPPED | OUTPATIENT
Start: 2019-03-22 | End: 2019-04-03 | Stop reason: SDUPTHER

## 2019-03-22 NOTE — TELEPHONE ENCOUNTER
Spoke with patient and told him that Dr. Dolan had sent in his prescription to his pharmacy. Patient confirmed his understanding.

## 2019-03-28 ENCOUNTER — TELEPHONE (OUTPATIENT)
Dept: FAMILY MEDICINE | Facility: CLINIC | Age: 34
End: 2019-03-28

## 2019-04-03 ENCOUNTER — OFFICE VISIT (OUTPATIENT)
Dept: FAMILY MEDICINE | Facility: CLINIC | Age: 34
End: 2019-04-03
Payer: COMMERCIAL

## 2019-04-03 ENCOUNTER — DOCUMENTATION ONLY (OUTPATIENT)
Dept: FAMILY MEDICINE | Facility: CLINIC | Age: 34
End: 2019-04-03

## 2019-04-03 VITALS
BODY MASS INDEX: 28.45 KG/M2 | DIASTOLIC BLOOD PRESSURE: 64 MMHG | WEIGHT: 203.25 LBS | RESPIRATION RATE: 16 BRPM | HEART RATE: 84 BPM | SYSTOLIC BLOOD PRESSURE: 124 MMHG | OXYGEN SATURATION: 97 % | HEIGHT: 71 IN

## 2019-04-03 DIAGNOSIS — F11.20 NARCOTIC DEPENDENCE: ICD-10-CM

## 2019-04-03 PROCEDURE — 3008F PR BODY MASS INDEX (BMI) DOCUMENTED: ICD-10-PCS | Mod: S$GLB,,, | Performed by: INTERNAL MEDICINE

## 2019-04-03 PROCEDURE — 3008F BODY MASS INDEX DOCD: CPT | Mod: S$GLB,,, | Performed by: INTERNAL MEDICINE

## 2019-04-03 PROCEDURE — 99213 PR OFFICE/OUTPT VISIT, EST, LEVL III, 20-29 MIN: ICD-10-PCS | Mod: S$GLB,,, | Performed by: INTERNAL MEDICINE

## 2019-04-03 PROCEDURE — 99213 OFFICE O/P EST LOW 20 MIN: CPT | Mod: S$GLB,,, | Performed by: INTERNAL MEDICINE

## 2019-04-03 RX ORDER — BUPRENORPHINE AND NALOXONE 8; 2 MG/1; MG/1
FILM, SOLUBLE BUCCAL; SUBLINGUAL
Qty: 60 PACKET | Refills: 0 | Status: SHIPPED | OUTPATIENT
Start: 2019-04-03 | End: 2019-04-30 | Stop reason: SDUPTHER

## 2019-04-03 NOTE — PROGRESS NOTES
"Subjective:       Patient ID: Vargas Lozano is a 33 y.o. male.    Chief Complaint: opioid dependence    HPI     HPI   The patient presents for medical management of opioid dependency. he is receiving maintenance therapy with buprenorphine.      CHIEF COMPLAINT: opoid dependence  HPI:     ONSET/TIMING:     DURATION: . Continuous(+).    QUALITY/COURSE:  unchanged.     INTENSITY/SEVERITY:  controlled.     CONTEXT/WHEN:     MODIFIERS/TREATMENTS:  Taking medications(+) Suboxone  8/2 # 40,   last rx given 3/6/19    SYMPTOMS/RELATED: no withdrawal symptoms. no cravings . No substance abuse.  No alcohol use     pnp checked: last month:  Yes            Review of Systems   Constitutional: Negative for fatigue and unexpected weight change.   Cardiovascular: Negative for leg swelling.   Gastrointestinal: Negative for constipation, diarrhea and nausea.   Musculoskeletal: Negative for gait problem.   Neurological: Negative for dizziness, tremors and headaches.   Psychiatric/Behavioral: Negative for behavioral problems, dysphoric mood and sleep disturbance.   Review of Systems        Objective:      Vitals:    04/03/19 1311   BP: 124/64   Pulse: 84   Resp: 16   SpO2: 97%   Weight: 92.2 kg (203 lb 4.2 oz)   Height: 5' 11" (1.803 m)   PainSc: 0-No pain     Physical Exam   Constitutional: He appears well-developed and well-nourished.   Cardiovascular: Normal rate, regular rhythm and normal heart sounds.   Pulmonary/Chest: Effort normal and breath sounds normal.   Abdominal: Soft. There is no tenderness.   Neurological: He is alert.   Psychiatric: He has a normal mood and affect. His behavior is normal. Thought content normal.   Nursing note and vitals reviewed.      .  Urine drug screen negative except buprenorphine   Assessment:       1. Narcotic dependence          Plan:   (+) pt taking medication as prescribed.    (+) dose is approproate.    (+) urine drug screen done.   (+) discussed risks of buprenorphine, including to " others.     (+) assessed if benefits outweigh risks of buprenorphine. .     (+) reviewed safe storage of medication.     (+) discussed proper use of buprenorphine, including missed doses.      Narcotic dependence  -     buprenorphine-naloxone (SUBOXONE) 8-2 mg Film; 1 sl bid  Dispense: 60 packet; Refill: 0      Follow up in about 1 month (around 5/3/2019).

## 2019-04-24 ENCOUNTER — TELEPHONE (OUTPATIENT)
Dept: FAMILY MEDICINE | Facility: CLINIC | Age: 34
End: 2019-04-24

## 2019-04-30 ENCOUNTER — OFFICE VISIT (OUTPATIENT)
Dept: FAMILY MEDICINE | Facility: CLINIC | Age: 34
End: 2019-04-30
Payer: COMMERCIAL

## 2019-04-30 ENCOUNTER — DOCUMENTATION ONLY (OUTPATIENT)
Dept: FAMILY MEDICINE | Facility: CLINIC | Age: 34
End: 2019-04-30

## 2019-04-30 VITALS
WEIGHT: 202.38 LBS | RESPIRATION RATE: 16 BRPM | HEIGHT: 71 IN | OXYGEN SATURATION: 97 % | BODY MASS INDEX: 28.33 KG/M2 | DIASTOLIC BLOOD PRESSURE: 82 MMHG | HEART RATE: 63 BPM | SYSTOLIC BLOOD PRESSURE: 122 MMHG | TEMPERATURE: 98 F

## 2019-04-30 DIAGNOSIS — F11.20 NARCOTIC DEPENDENCE: ICD-10-CM

## 2019-04-30 PROCEDURE — 3008F BODY MASS INDEX DOCD: CPT | Mod: S$GLB,,, | Performed by: INTERNAL MEDICINE

## 2019-04-30 PROCEDURE — 99213 PR OFFICE/OUTPT VISIT, EST, LEVL III, 20-29 MIN: ICD-10-PCS | Mod: S$GLB,,, | Performed by: INTERNAL MEDICINE

## 2019-04-30 PROCEDURE — 3008F PR BODY MASS INDEX (BMI) DOCUMENTED: ICD-10-PCS | Mod: S$GLB,,, | Performed by: INTERNAL MEDICINE

## 2019-04-30 PROCEDURE — 99213 OFFICE O/P EST LOW 20 MIN: CPT | Mod: S$GLB,,, | Performed by: INTERNAL MEDICINE

## 2019-04-30 RX ORDER — BUPRENORPHINE AND NALOXONE 8; 2 MG/1; MG/1
FILM, SOLUBLE BUCCAL; SUBLINGUAL
Qty: 60 PACKET | Refills: 0 | Status: SHIPPED | OUTPATIENT
Start: 2019-04-30 | End: 2019-06-04 | Stop reason: SDUPTHER

## 2019-04-30 NOTE — PROGRESS NOTES
"Subjective:       Patient ID: Vargas Lozano is a 33 y.o. male.    Chief Complaint: opioid dependence    HPI     HPI   The patient presents for medical management of opioid dependency. he is receiving maintenance therapy with buprenorphine.      CHIEF COMPLAINT: opoid dependence  HPI:     ONSET/TIMING:     DURATION: . Continuous(+).    QUALITY/COURSE:  unchanged.     INTENSITY/SEVERITY:  controlled.     CONTEXT/WHEN:     MODIFIERS/TREATMENTS:  Taking medications(+) Suboxone  8/2 # 60,   last rx given 4/3/19    SYMPTOMS/RELATED: no withdrawal symptoms. no cravings . No substance abuse.  No alcohol use     pnp checked: last month:  Yes            Review of Systems   Constitutional: Negative for fatigue and unexpected weight change.   Cardiovascular: Negative for leg swelling.   Gastrointestinal: Negative for constipation, diarrhea and nausea.   Musculoskeletal: Negative for gait problem.   Neurological: Negative for dizziness, tremors and headaches.   Psychiatric/Behavioral: Negative for behavioral problems, dysphoric mood and sleep disturbance.   Review of Systems        Objective:      Vitals:    04/30/19 1127   BP: 122/82   Pulse: 63   Resp: 16   Temp: 98 °F (36.7 °C)   TempSrc: Oral   SpO2: 97%   Weight: 91.8 kg (202 lb 6.1 oz)   Height: 5' 11" (1.803 m)   PainSc: 0-No pain     Physical Exam   Constitutional: He appears well-developed and well-nourished.   Cardiovascular: Normal rate, regular rhythm and normal heart sounds.   Pulmonary/Chest: Effort normal and breath sounds normal.   Abdominal: Soft. There is no tenderness.   Neurological: He is alert.   Psychiatric: He has a normal mood and affect. His behavior is normal. Thought content normal.   Nursing note and vitals reviewed.      .  Urine drug screen negative except buprenorphine   Assessment:       No diagnosis found.      Plan:   (+) pt taking medication as prescribed.    (+) dose is approproate.    (+) urine drug screen done.   (+) discussed risks " of buprenorphine, including to others.     (+) assessed if benefits outweigh risks of buprenorphine. .     (+) reviewed safe storage of medication.     (+) discussed proper use of buprenorphine, including missed doses.      There are no diagnoses linked to this encounter.  No follow-ups on file.

## 2019-05-27 ENCOUNTER — PATIENT MESSAGE (OUTPATIENT)
Dept: FAMILY MEDICINE | Facility: CLINIC | Age: 34
End: 2019-05-27

## 2019-05-29 ENCOUNTER — TELEPHONE (OUTPATIENT)
Dept: FAMILY MEDICINE | Facility: CLINIC | Age: 34
End: 2019-05-29

## 2019-05-31 ENCOUNTER — CLINICAL SUPPORT (OUTPATIENT)
Dept: FAMILY MEDICINE | Facility: CLINIC | Age: 34
End: 2019-05-31
Payer: COMMERCIAL

## 2019-05-31 DIAGNOSIS — F11.20 NARCOTIC DEPENDENCE: Primary | ICD-10-CM

## 2019-05-31 PROCEDURE — 80305 POCT BUP URINE DRUG TEST: ICD-10-PCS | Mod: QW,S$GLB,, | Performed by: INTERNAL MEDICINE

## 2019-05-31 PROCEDURE — 80305 DRUG TEST PRSMV DIR OPT OBS: CPT | Mod: QW,S$GLB,, | Performed by: INTERNAL MEDICINE

## 2019-06-04 ENCOUNTER — OFFICE VISIT (OUTPATIENT)
Dept: FAMILY MEDICINE | Facility: CLINIC | Age: 34
End: 2019-06-04
Payer: COMMERCIAL

## 2019-06-04 ENCOUNTER — DOCUMENTATION ONLY (OUTPATIENT)
Dept: FAMILY MEDICINE | Facility: CLINIC | Age: 34
End: 2019-06-04

## 2019-06-04 VITALS
OXYGEN SATURATION: 98 % | DIASTOLIC BLOOD PRESSURE: 80 MMHG | RESPIRATION RATE: 16 BRPM | HEIGHT: 71 IN | WEIGHT: 203.25 LBS | TEMPERATURE: 98 F | SYSTOLIC BLOOD PRESSURE: 124 MMHG | BODY MASS INDEX: 28.45 KG/M2 | HEART RATE: 78 BPM

## 2019-06-04 DIAGNOSIS — F11.20 NARCOTIC DEPENDENCE: ICD-10-CM

## 2019-06-04 PROCEDURE — 3008F PR BODY MASS INDEX (BMI) DOCUMENTED: ICD-10-PCS | Mod: S$GLB,,, | Performed by: INTERNAL MEDICINE

## 2019-06-04 PROCEDURE — 99213 PR OFFICE/OUTPT VISIT, EST, LEVL III, 20-29 MIN: ICD-10-PCS | Mod: S$GLB,,, | Performed by: INTERNAL MEDICINE

## 2019-06-04 PROCEDURE — 3008F BODY MASS INDEX DOCD: CPT | Mod: S$GLB,,, | Performed by: INTERNAL MEDICINE

## 2019-06-04 PROCEDURE — 99213 OFFICE O/P EST LOW 20 MIN: CPT | Mod: S$GLB,,, | Performed by: INTERNAL MEDICINE

## 2019-06-04 RX ORDER — BUPRENORPHINE AND NALOXONE 8; 2 MG/1; MG/1
FILM, SOLUBLE BUCCAL; SUBLINGUAL
Qty: 60 PACKET | Refills: 0 | Status: SHIPPED | OUTPATIENT
Start: 2019-06-04 | End: 2019-07-02 | Stop reason: SDUPTHER

## 2019-06-04 NOTE — PROGRESS NOTES
Health Maintenance Due   Topic Date Due    Lipid Panel  1983    Pneumococcal Vaccine (Medium Risk) (1 of 1 - PPSV23) 11/16/2002

## 2019-06-04 NOTE — PROGRESS NOTES
"Subjective:       Patient ID: Vargas Lozano is a 33 y.o. male.    Chief Complaint: narcotic dependence    HPI     HPI   The patient presents for medical management of opioid dependency. he is receiving maintenance therapy with buprenorphine.      CHIEF COMPLAINT: opoid dependence  HPI:     ONSET/TIMING:     DURATION: . Continuous(+).    QUALITY/COURSE:  unchanged.     INTENSITY/SEVERITY:  controlled.     CONTEXT/WHEN:     MODIFIERS/TREATMENTS:  Taking medications(+) Suboxone  8/2 # 60,   last rx given 4/30/19    SYMPTOMS/RELATED: no withdrawal symptoms. no cravings . No substance abuse.  No alcohol use     pnp checked: last month:  Yes              Review of Systems   Constitutional: Negative for diaphoresis, fatigue and unexpected weight change.   Gastrointestinal: Negative for constipation, diarrhea, nausea and vomiting.   Neurological: Negative for dizziness, light-headedness and headaches.   Psychiatric/Behavioral: Negative for dysphoric mood and sleep disturbance. The patient is not nervous/anxious.          Objective:      Vitals:    06/04/19 1636   BP: 124/80   Pulse: 78   Resp: 16   Temp: 98 °F (36.7 °C)   TempSrc: Oral   SpO2: 98%   Weight: 92.2 kg (203 lb 4.2 oz)   Height: 5' 11" (1.803 m)   PainSc: 0-No pain     Physical Exam   Constitutional: He appears well-developed and well-nourished.   Cardiovascular: Normal rate, regular rhythm and normal heart sounds.   Pulmonary/Chest: Effort normal and breath sounds normal.   Abdominal: Soft. There is no tenderness.   Neurological: He is alert.   Psychiatric: He has a normal mood and affect. His behavior is normal. Thought content normal.   Nursing note and vitals reviewed.      .  Urine drug screen negative except buprenorphine   Assessment:       1. Narcotic dependence          Plan:   (+) pt taking medication as prescribed.    (+) dose is approproate.    (+) urine drug screen done.   (+) discussed risks of buprenorphine, including to others.     (+) " assessed if benefits outweigh risks of buprenorphine. .     (+) reviewed safe storage of medication.     (+) discussed proper use of buprenorphine, including missed doses.      Narcotic dependence  -     buprenorphine-naloxone (SUBOXONE) 8-2 mg Film; 1 sl bid  Dispense: 60 packet; Refill: 0      Follow up in about 1 month (around 7/4/2019).

## 2019-06-13 ENCOUNTER — TELEPHONE (OUTPATIENT)
Dept: FAMILY MEDICINE | Facility: CLINIC | Age: 34
End: 2019-06-13

## 2019-07-02 ENCOUNTER — OFFICE VISIT (OUTPATIENT)
Dept: FAMILY MEDICINE | Facility: CLINIC | Age: 34
End: 2019-07-02
Payer: COMMERCIAL

## 2019-07-02 VITALS
BODY MASS INDEX: 28.61 KG/M2 | HEIGHT: 71 IN | HEART RATE: 65 BPM | DIASTOLIC BLOOD PRESSURE: 80 MMHG | RESPIRATION RATE: 16 BRPM | WEIGHT: 204.38 LBS | OXYGEN SATURATION: 97 % | SYSTOLIC BLOOD PRESSURE: 120 MMHG | TEMPERATURE: 98 F

## 2019-07-02 DIAGNOSIS — F11.20 NARCOTIC DEPENDENCE: ICD-10-CM

## 2019-07-02 PROCEDURE — 3008F BODY MASS INDEX DOCD: CPT | Mod: S$GLB,,, | Performed by: INTERNAL MEDICINE

## 2019-07-02 PROCEDURE — 3008F PR BODY MASS INDEX (BMI) DOCUMENTED: ICD-10-PCS | Mod: S$GLB,,, | Performed by: INTERNAL MEDICINE

## 2019-07-02 PROCEDURE — 99213 OFFICE O/P EST LOW 20 MIN: CPT | Mod: S$GLB,,, | Performed by: INTERNAL MEDICINE

## 2019-07-02 PROCEDURE — 99213 PR OFFICE/OUTPT VISIT, EST, LEVL III, 20-29 MIN: ICD-10-PCS | Mod: S$GLB,,, | Performed by: INTERNAL MEDICINE

## 2019-07-02 RX ORDER — BUPRENORPHINE AND NALOXONE 8; 2 MG/1; MG/1
FILM, SOLUBLE BUCCAL; SUBLINGUAL
Qty: 40 PACKET | Refills: 0 | Status: SHIPPED | OUTPATIENT
Start: 2019-07-02 | End: 2019-07-26 | Stop reason: SDUPTHER

## 2019-07-02 NOTE — PROGRESS NOTES
"Subjective:       Patient ID: Vargas Lozano is a 33 y.o. male.    Chief Complaint: narcotic dependence    HPI     HPI   The patient presents for medical management of opioid dependency. he is receiving maintenance therapy with buprenorphine.      CHIEF COMPLAINT: opoid dependence  HPI:     ONSET/TIMING:     DURATION: . Continuous(+).    QUALITY/COURSE:  unchanged.     INTENSITY/SEVERITY:  controlled.     CONTEXT/WHEN:     MODIFIERS/TREATMENTS:  Taking medications(+) Suboxone  8/2 # 60,   last rx given 6/4/19    SYMPTOMS/RELATED: no withdrawal symptoms. no cravings . No substance abuse.  No alcohol use     pnp checked: last month:  Yes              Review of Systems   Constitutional: Negative for diaphoresis, fatigue and unexpected weight change.   Gastrointestinal: Negative for constipation, diarrhea, nausea and vomiting.   Neurological: Negative for dizziness, light-headedness and headaches.   Psychiatric/Behavioral: Negative for dysphoric mood and sleep disturbance. The patient is not nervous/anxious.          Objective:      Vitals:    07/02/19 0830   BP: 120/80   Pulse: 65   Resp: 16   Temp: 98 °F (36.7 °C)   TempSrc: Oral   SpO2: 97%   Weight: 92.7 kg (204 lb 5.9 oz)   Height: 5' 11" (1.803 m)   PainSc: 0-No pain     Physical Exam   Constitutional: He appears well-developed and well-nourished.   Cardiovascular: Normal rate, regular rhythm and normal heart sounds.   Pulmonary/Chest: Effort normal and breath sounds normal.   Abdominal: Soft. There is no tenderness.   Neurological: He is alert.   Psychiatric: He has a normal mood and affect. His behavior is normal. Thought content normal.   Nursing note and vitals reviewed.       Assessment:       1. Narcotic dependence          Plan:   (+) pt taking medication as prescribed.    (+) dose is approproate.    (-) urine drug screen done. Will give pt 2/3 of rx for suboxone.     (+) discussed risks of buprenorphine, including to others.     (+) assessed if " benefits outweigh risks of buprenorphine. .     (+) reviewed safe storage of medication.     (+) discussed proper use of buprenorphine, including missed doses.        Narcotic dependence  -     buprenorphine-naloxone (SUBOXONE) 8-2 mg Film; 1 sl bid  Dispense: 40 packet; Refill: 0      No follow-ups on file.

## 2019-07-10 ENCOUNTER — TELEPHONE (OUTPATIENT)
Dept: FAMILY MEDICINE | Facility: CLINIC | Age: 34
End: 2019-07-10

## 2019-07-11 ENCOUNTER — CLINICAL SUPPORT (OUTPATIENT)
Dept: FAMILY MEDICINE | Facility: CLINIC | Age: 34
End: 2019-07-11
Payer: COMMERCIAL

## 2019-07-11 DIAGNOSIS — F11.20 NARCOTIC DEPENDENCE: Primary | ICD-10-CM

## 2019-07-11 PROCEDURE — 80305 POCT BUP URINE DRUG TEST: ICD-10-PCS | Mod: QW,S$GLB,, | Performed by: INTERNAL MEDICINE

## 2019-07-11 PROCEDURE — 80305 DRUG TEST PRSMV DIR OPT OBS: CPT | Mod: QW,S$GLB,, | Performed by: INTERNAL MEDICINE

## 2019-07-25 ENCOUNTER — PATIENT MESSAGE (OUTPATIENT)
Dept: FAMILY MEDICINE | Facility: CLINIC | Age: 34
End: 2019-07-25

## 2019-07-26 ENCOUNTER — TELEPHONE (OUTPATIENT)
Dept: FAMILY MEDICINE | Facility: CLINIC | Age: 34
End: 2019-07-26

## 2019-07-26 DIAGNOSIS — F11.20 NARCOTIC DEPENDENCE: ICD-10-CM

## 2019-07-26 RX ORDER — BUPRENORPHINE AND NALOXONE 8; 2 MG/1; MG/1
FILM, SOLUBLE BUCCAL; SUBLINGUAL
Qty: 20 PACKET | Refills: 0 | Status: SHIPPED | OUTPATIENT
Start: 2019-07-26 | End: 2019-08-01 | Stop reason: SDUPTHER

## 2019-07-29 ENCOUNTER — DOCUMENTATION ONLY (OUTPATIENT)
Dept: FAMILY MEDICINE | Facility: CLINIC | Age: 34
End: 2019-07-29

## 2019-08-01 ENCOUNTER — OFFICE VISIT (OUTPATIENT)
Dept: FAMILY MEDICINE | Facility: CLINIC | Age: 34
End: 2019-08-01
Payer: COMMERCIAL

## 2019-08-01 ENCOUNTER — DOCUMENTATION ONLY (OUTPATIENT)
Dept: FAMILY MEDICINE | Facility: CLINIC | Age: 34
End: 2019-08-01

## 2019-08-01 VITALS
SYSTOLIC BLOOD PRESSURE: 114 MMHG | HEART RATE: 72 BPM | WEIGHT: 206.81 LBS | TEMPERATURE: 98 F | RESPIRATION RATE: 16 BRPM | OXYGEN SATURATION: 96 % | DIASTOLIC BLOOD PRESSURE: 84 MMHG | HEIGHT: 71 IN | BODY MASS INDEX: 28.95 KG/M2

## 2019-08-01 DIAGNOSIS — F11.20 NARCOTIC DEPENDENCE: ICD-10-CM

## 2019-08-01 PROBLEM — G93.0 ARACHNOID CYST: Status: ACTIVE | Noted: 2019-08-01

## 2019-08-01 PROCEDURE — 99213 PR OFFICE/OUTPT VISIT, EST, LEVL III, 20-29 MIN: ICD-10-PCS | Mod: S$GLB,,, | Performed by: INTERNAL MEDICINE

## 2019-08-01 PROCEDURE — 3008F PR BODY MASS INDEX (BMI) DOCUMENTED: ICD-10-PCS | Mod: S$GLB,,, | Performed by: INTERNAL MEDICINE

## 2019-08-01 PROCEDURE — 99213 OFFICE O/P EST LOW 20 MIN: CPT | Mod: S$GLB,,, | Performed by: INTERNAL MEDICINE

## 2019-08-01 PROCEDURE — 3008F BODY MASS INDEX DOCD: CPT | Mod: S$GLB,,, | Performed by: INTERNAL MEDICINE

## 2019-08-01 RX ORDER — BUPRENORPHINE AND NALOXONE 8; 2 MG/1; MG/1
FILM, SOLUBLE BUCCAL; SUBLINGUAL
Qty: 60 PACKET | Refills: 0 | Status: SHIPPED | OUTPATIENT
Start: 2019-08-01 | End: 2019-09-23 | Stop reason: SDUPTHER

## 2019-08-01 NOTE — PROGRESS NOTES
"Subjective:       Patient ID: Vargas Lozano is a 33 y.o. male.    Chief Complaint: narcotic dependence    HPI     HPI   The patient presents for medical management of opioid dependency. he is receiving maintenance therapy with buprenorphine.      CHIEF COMPLAINT: opoid dependence  HPI:     ONSET/TIMING:     DURATION: . Continuous(+).    QUALITY/COURSE:  unchanged.     INTENSITY/SEVERITY:  controlled.     CONTEXT/WHEN:     MODIFIERS/TREATMENTS:  Taking medications(+) Suboxone  8/2 # 60,   last rx given 7/2/19    SYMPTOMS/RELATED: no withdrawal symptoms. no cravings . No substance abuse.  No alcohol use     pnp checked: last month:  Yes              Review of Systems   Constitutional: Negative for diaphoresis, fatigue and unexpected weight change.   Gastrointestinal: Negative for constipation, diarrhea, nausea and vomiting.   Neurological: Negative for dizziness, light-headedness and headaches.   Psychiatric/Behavioral: Negative for dysphoric mood and sleep disturbance. The patient is not nervous/anxious.          Objective:      Vitals:    08/01/19 1105   BP: 114/84   Pulse: 72   Resp: 16   Temp: 97.9 °F (36.6 °C)   TempSrc: Oral   SpO2: 96%   Weight: 93.8 kg (206 lb 12.7 oz)   Height: 5' 11" (1.803 m)   PainSc: 0-No pain     Physical Exam   Constitutional: He appears well-developed and well-nourished.   Cardiovascular: Normal rate, regular rhythm and normal heart sounds.   Pulmonary/Chest: Effort normal and breath sounds normal.   Abdominal: Soft. There is no tenderness.   Neurological: He is alert.   Psychiatric: He has a normal mood and affect. His behavior is normal. Thought content normal.   Nursing note and vitals reviewed.      Urine drug screen negative except buprenorphine.     Assessment:       1. Narcotic dependence          Plan:   (+) pt taking medication as prescribed.    (+) dose is approproate.    (+) urine drug screen done.   (+) discussed risks of buprenorphine, including to others.     (+) " assessed if benefits outweigh risks of buprenorphine. .     (+) reviewed safe storage of medication.     (+) discussed proper use of buprenorphine, including missed doses.          Narcotic dependence  -     buprenorphine-naloxone (SUBOXONE) 8-2 mg Film; 1 sl bid  Dispense: 60 packet; Refill: 0      No follow-ups on file.

## 2019-08-01 NOTE — PATIENT INSTRUCTIONS
An order for a urine drug screen with buprenorphine was given.  The patient is to use the order when called, on a random day.         Please fill out the patient experience survey.

## 2019-08-08 ENCOUNTER — TELEPHONE (OUTPATIENT)
Dept: FAMILY MEDICINE | Facility: CLINIC | Age: 34
End: 2019-08-08

## 2019-09-03 ENCOUNTER — DOCUMENTATION ONLY (OUTPATIENT)
Dept: FAMILY MEDICINE | Facility: CLINIC | Age: 34
End: 2019-09-03

## 2019-09-13 ENCOUNTER — TELEPHONE (OUTPATIENT)
Dept: FAMILY MEDICINE | Facility: CLINIC | Age: 34
End: 2019-09-13

## 2019-09-13 NOTE — TELEPHONE ENCOUNTER
----- Message from Tahir Rudolph sent at 9/13/2019 10:33 AM CDT -----  Contact: patient   Type:  Sooner Apoointment Request    Caller is requesting a sooner appointment.  Caller declined first available appointment listed below.  Caller will not accept being placed on the waitlist and is requesting a message be sent to doctor.    Name of Caller:  Patient   When is the first available appointment?  October   Symptoms:  SUB  Best Call Back Number:  [phone] (home)   Additional Information: pt is requesting to be seen next week

## 2019-09-19 ENCOUNTER — PATIENT MESSAGE (OUTPATIENT)
Dept: FAMILY MEDICINE | Facility: CLINIC | Age: 34
End: 2019-09-19

## 2019-09-23 ENCOUNTER — OFFICE VISIT (OUTPATIENT)
Dept: FAMILY MEDICINE | Facility: CLINIC | Age: 34
End: 2019-09-23
Payer: COMMERCIAL

## 2019-09-23 ENCOUNTER — DOCUMENTATION ONLY (OUTPATIENT)
Dept: FAMILY MEDICINE | Facility: CLINIC | Age: 34
End: 2019-09-23

## 2019-09-23 VITALS
TEMPERATURE: 98 F | SYSTOLIC BLOOD PRESSURE: 130 MMHG | RESPIRATION RATE: 16 BRPM | WEIGHT: 206.56 LBS | BODY MASS INDEX: 28.92 KG/M2 | HEART RATE: 79 BPM | DIASTOLIC BLOOD PRESSURE: 70 MMHG | OXYGEN SATURATION: 97 % | HEIGHT: 71 IN

## 2019-09-23 DIAGNOSIS — F11.20 NARCOTIC DEPENDENCE: ICD-10-CM

## 2019-09-23 PROCEDURE — 99213 OFFICE O/P EST LOW 20 MIN: CPT | Mod: S$GLB,,, | Performed by: INTERNAL MEDICINE

## 2019-09-23 PROCEDURE — 3008F PR BODY MASS INDEX (BMI) DOCUMENTED: ICD-10-PCS | Mod: S$GLB,,, | Performed by: INTERNAL MEDICINE

## 2019-09-23 PROCEDURE — 3008F BODY MASS INDEX DOCD: CPT | Mod: S$GLB,,, | Performed by: INTERNAL MEDICINE

## 2019-09-23 PROCEDURE — 99213 PR OFFICE/OUTPT VISIT, EST, LEVL III, 20-29 MIN: ICD-10-PCS | Mod: S$GLB,,, | Performed by: INTERNAL MEDICINE

## 2019-09-23 RX ORDER — BUPRENORPHINE AND NALOXONE 8; 2 MG/1; MG/1
FILM, SOLUBLE BUCCAL; SUBLINGUAL
Qty: 30 PACKET | Refills: 0 | Status: SHIPPED | OUTPATIENT
Start: 2019-09-23 | End: 2019-10-29 | Stop reason: SDUPTHER

## 2019-09-23 NOTE — PROGRESS NOTES
"Subjective:       Patient ID: Vargas Lozano is a 33 y.o. male.    Chief Complaint: narcotic dependence    HPI     HPI   The patient presents for medical management of opioid dependency. he is receiving maintenance therapy with buprenorphine.      CHIEF COMPLAINT: opoid dependence  HPI:  Cut down to 1.25 strip per day    ONSET/TIMING:     DURATION: . Continuous(+).    QUALITY/COURSE:  unchanged.     INTENSITY/SEVERITY:  controlled.     CONTEXT/WHEN:     MODIFIERS/TREATMENTS:  Taking medications(+) Suboxone  8/2 # 60,   last rx given 8.1.19    SYMPTOMS/RELATED: no withdrawal symptoms. no cravings . No substance abuse.  No alcohol use     pnp checked: last month:  Yes              Review of Systems   Constitutional: Negative for diaphoresis, fatigue and unexpected weight change.   Gastrointestinal: Negative for constipation, diarrhea, nausea and vomiting.   Neurological: Negative for dizziness, light-headedness and headaches.   Psychiatric/Behavioral: Negative for dysphoric mood and sleep disturbance. The patient is not nervous/anxious.          Objective:      Vitals:    09/23/19 1411   BP: 130/70   Pulse: 79   Resp: 16   Temp: 98 °F (36.7 °C)   TempSrc: Oral   SpO2: 97%   Weight: 93.7 kg (206 lb 9.1 oz)   Height: 5' 11" (1.803 m)   PainSc: 0-No pain     Physical Exam   Constitutional: He appears well-developed and well-nourished.   Cardiovascular: Normal rate, regular rhythm and normal heart sounds.   Pulmonary/Chest: Effort normal and breath sounds normal.   Abdominal: Soft. There is no tenderness.   Neurological: He is alert.   Psychiatric: He has a normal mood and affect. His behavior is normal. Thought content normal.   Nursing note and vitals reviewed.        Assessment:       1. Narcotic dependence          Plan:   (+) pt taking medication as prescribed.    (+) dose is approproate.    (-) urine drug screen done. Will give pt 2/3 of rx for suboxone.     (+) discussed risks of buprenorphine, including to " others.     (+) assessed if benefits outweigh risks of buprenorphine. .     (+) reviewed safe storage of medication.     (+) discussed proper use of buprenorphine, including missed doses.            Narcotic dependence  -     buprenorphine-naloxone (SUBOXONE) 8-2 mg Film; 1 sl bid  Dispense: 30 packet; Refill: 0      Follow up in about 1 month (around 10/23/2019).

## 2019-10-24 ENCOUNTER — PATIENT MESSAGE (OUTPATIENT)
Dept: FAMILY MEDICINE | Facility: CLINIC | Age: 34
End: 2019-10-24

## 2019-10-29 ENCOUNTER — OFFICE VISIT (OUTPATIENT)
Dept: FAMILY MEDICINE | Facility: CLINIC | Age: 34
End: 2019-10-29
Payer: COMMERCIAL

## 2019-10-29 ENCOUNTER — DOCUMENTATION ONLY (OUTPATIENT)
Dept: FAMILY MEDICINE | Facility: CLINIC | Age: 34
End: 2019-10-29

## 2019-10-29 VITALS
TEMPERATURE: 98 F | BODY MASS INDEX: 29.04 KG/M2 | SYSTOLIC BLOOD PRESSURE: 136 MMHG | HEIGHT: 71 IN | RESPIRATION RATE: 16 BRPM | DIASTOLIC BLOOD PRESSURE: 82 MMHG | WEIGHT: 207.44 LBS | OXYGEN SATURATION: 98 % | HEART RATE: 69 BPM

## 2019-10-29 DIAGNOSIS — F11.20 NARCOTIC DEPENDENCE: ICD-10-CM

## 2019-10-29 PROCEDURE — 3008F PR BODY MASS INDEX (BMI) DOCUMENTED: ICD-10-PCS | Mod: S$GLB,,, | Performed by: INTERNAL MEDICINE

## 2019-10-29 PROCEDURE — 99213 PR OFFICE/OUTPT VISIT, EST, LEVL III, 20-29 MIN: ICD-10-PCS | Mod: S$GLB,,, | Performed by: INTERNAL MEDICINE

## 2019-10-29 PROCEDURE — 99213 OFFICE O/P EST LOW 20 MIN: CPT | Mod: S$GLB,,, | Performed by: INTERNAL MEDICINE

## 2019-10-29 PROCEDURE — 3008F BODY MASS INDEX DOCD: CPT | Mod: S$GLB,,, | Performed by: INTERNAL MEDICINE

## 2019-10-29 RX ORDER — BUPRENORPHINE AND NALOXONE 8; 2 MG/1; MG/1
FILM, SOLUBLE BUCCAL; SUBLINGUAL
Qty: 60 PACKET | Refills: 0 | Status: SHIPPED | OUTPATIENT
Start: 2019-10-29 | End: 2019-12-12 | Stop reason: SDUPTHER

## 2019-10-29 NOTE — PROGRESS NOTES
"Subjective:       Patient ID: Vargas Lozano is a 33 y.o. male.    Chief Complaint: narcotic dependence    HPI     HPI   The patient presents for medical management of opioid dependency. he is receiving maintenance therapy with buprenorphine.      CHIEF COMPLAINT: opoid dependence  HPI:     ONSET/TIMING:     DURATION: . Continuous(+).    QUALITY/COURSE:  unchanged.     INTENSITY/SEVERITY:  controlled.     CONTEXT/WHEN:     MODIFIERS/TREATMENTS:  Taking medications(+) Suboxone  8/2 # 60,   last rx given 9/23/19    SYMPTOMS/RELATED: no withdrawal symptoms. no cravings . No substance abuse.  No alcohol use     pnp checked: last month:  Yes              Review of Systems   Constitutional: Negative for diaphoresis, fatigue and unexpected weight change.   Gastrointestinal: Negative for constipation, diarrhea, nausea and vomiting.   Neurological: Negative for dizziness, light-headedness and headaches.   Psychiatric/Behavioral: Negative for dysphoric mood and sleep disturbance. The patient is not nervous/anxious.          Objective:      Vitals:    10/29/19 1419   BP: 136/82   Pulse: 69   Resp: 16   Temp: 97.9 °F (36.6 °C)   TempSrc: Oral   SpO2: 98%   Weight: 94.1 kg (207 lb 7.3 oz)   Height: 5' 11" (1.803 m)   PainSc: 0-No pain     Physical Exam   Constitutional: He appears well-developed and well-nourished.   Cardiovascular: Normal rate, regular rhythm and normal heart sounds.   Pulmonary/Chest: Effort normal and breath sounds normal.   Abdominal: Soft. There is no tenderness.   Neurological: He is alert.   Psychiatric: He has a normal mood and affect. His behavior is normal. Thought content normal.   Nursing note and vitals reviewed.       No drug screen done last month because he was not called.  No test strips available.      Assessment:       1. Narcotic dependence          Plan:   (+) pt taking medication as prescribed.    (+) dose is approproate.    (-) urine drug screen done.   (+) discussed risks of " buprenorphine, including to others.     (+) assessed if benefits outweigh risks of buprenorphine. .     (+) reviewed safe storage of medication.     (+) discussed proper use of buprenorphine, including missed doses.          Narcotic dependence  -     buprenorphine-naloxone (SUBOXONE) 8-2 mg Film; 1 sl bid  Dispense: 60 packet; Refill: 0  -     Misc Sendout Test, Non-Blood 10 Panel drug screen plus buphenorphine; Future      Follow up in about 1 month (around 11/29/2019).

## 2019-11-12 ENCOUNTER — TELEPHONE (OUTPATIENT)
Dept: FAMILY MEDICINE | Facility: CLINIC | Age: 34
End: 2019-11-12

## 2019-11-13 ENCOUNTER — CLINICAL SUPPORT (OUTPATIENT)
Dept: FAMILY MEDICINE | Facility: CLINIC | Age: 34
End: 2019-11-13
Payer: COMMERCIAL

## 2019-11-13 DIAGNOSIS — F11.20 NARCOTIC DEPENDENCE: Primary | ICD-10-CM

## 2019-11-13 PROCEDURE — 80305 DRUG TEST PRSMV DIR OPT OBS: CPT | Mod: QW,S$GLB,, | Performed by: INTERNAL MEDICINE

## 2019-11-13 PROCEDURE — 80305 POCT BUP URINE DRUG TEST: ICD-10-PCS | Mod: QW,S$GLB,, | Performed by: INTERNAL MEDICINE

## 2019-12-02 ENCOUNTER — PATIENT MESSAGE (OUTPATIENT)
Dept: FAMILY MEDICINE | Facility: CLINIC | Age: 34
End: 2019-12-02

## 2019-12-02 ENCOUNTER — DOCUMENTATION ONLY (OUTPATIENT)
Dept: FAMILY MEDICINE | Facility: CLINIC | Age: 34
End: 2019-12-02

## 2019-12-04 ENCOUNTER — TELEPHONE (OUTPATIENT)
Dept: FAMILY MEDICINE | Facility: CLINIC | Age: 34
End: 2019-12-04

## 2019-12-12 ENCOUNTER — DOCUMENTATION ONLY (OUTPATIENT)
Dept: FAMILY MEDICINE | Facility: CLINIC | Age: 34
End: 2019-12-12

## 2019-12-12 ENCOUNTER — OFFICE VISIT (OUTPATIENT)
Dept: FAMILY MEDICINE | Facility: CLINIC | Age: 34
End: 2019-12-12
Payer: COMMERCIAL

## 2019-12-12 VITALS
SYSTOLIC BLOOD PRESSURE: 124 MMHG | OXYGEN SATURATION: 98 % | BODY MASS INDEX: 28.11 KG/M2 | DIASTOLIC BLOOD PRESSURE: 68 MMHG | RESPIRATION RATE: 16 BRPM | HEART RATE: 62 BPM | TEMPERATURE: 98 F | WEIGHT: 200.81 LBS | HEIGHT: 71 IN

## 2019-12-12 DIAGNOSIS — F11.20 NARCOTIC DEPENDENCE: ICD-10-CM

## 2019-12-12 PROCEDURE — 99213 OFFICE O/P EST LOW 20 MIN: CPT | Mod: S$GLB,,, | Performed by: INTERNAL MEDICINE

## 2019-12-12 PROCEDURE — 3008F PR BODY MASS INDEX (BMI) DOCUMENTED: ICD-10-PCS | Mod: S$GLB,,, | Performed by: INTERNAL MEDICINE

## 2019-12-12 PROCEDURE — 99213 PR OFFICE/OUTPT VISIT, EST, LEVL III, 20-29 MIN: ICD-10-PCS | Mod: S$GLB,,, | Performed by: INTERNAL MEDICINE

## 2019-12-12 PROCEDURE — 3008F BODY MASS INDEX DOCD: CPT | Mod: S$GLB,,, | Performed by: INTERNAL MEDICINE

## 2019-12-12 RX ORDER — BUPRENORPHINE AND NALOXONE 8; 2 MG/1; MG/1
FILM, SOLUBLE BUCCAL; SUBLINGUAL
Qty: 60 PACKET | Refills: 0 | Status: SHIPPED | OUTPATIENT
Start: 2019-12-12 | End: 2020-01-15 | Stop reason: SDUPTHER

## 2019-12-12 RX ORDER — SULFAMETHOXAZOLE AND TRIMETHOPRIM 800; 160 MG/1; MG/1
1 TABLET ORAL
COMMUNITY
Start: 2019-12-10 | End: 2019-12-31

## 2019-12-12 NOTE — PROGRESS NOTES
"Subjective:       Patient ID: Vargas Lozano is a 34 y.o. male.    Chief Complaint: narcotic dependence    HPI     HPI   The patient presents for medical management of opioid dependency. he is receiving maintenance therapy with buprenorphine.      CHIEF COMPLAINT: opoid dependence  HPI:     ONSET/TIMING:     DURATION: . Continuous(+).    QUALITY/COURSE:  unchanged.     INTENSITY/SEVERITY:  controlled.     CONTEXT/WHEN:     MODIFIERS/TREATMENTS:  Taking medications(+) Suboxone  8/2 # 60,   last rx given 10.29.19     SYMPTOMS/RELATED: no withdrawal symptoms. no cravings . No substance abuse.  No alcohol use     pnp checked: last month:  Yes              Review of Systems   Constitutional: Negative for diaphoresis, fatigue and unexpected weight change.   Gastrointestinal: Negative for constipation, diarrhea, nausea and vomiting.   Neurological: Negative for dizziness, light-headedness and headaches.   Psychiatric/Behavioral: Negative for dysphoric mood and sleep disturbance. The patient is not nervous/anxious.          Objective:      Vitals:    12/12/19 1016   BP: 124/68   Pulse: 62   Resp: 16   Temp: 98.2 °F (36.8 °C)   TempSrc: Oral   SpO2: 98%   Weight: 91.1 kg (200 lb 13.4 oz)   Height: 5' 11" (1.803 m)   PainSc: 0-No pain     Physical Exam   Constitutional: He appears well-developed and well-nourished.   Cardiovascular: Normal rate, regular rhythm and normal heart sounds.   Pulmonary/Chest: Effort normal and breath sounds normal.   Abdominal: Soft. There is no tenderness.   Neurological: He is alert.   Psychiatric: He has a normal mood and affect. His behavior is normal. Thought content normal.   Nursing note and vitals reviewed.      Urine drug screen negative except buprenorphine.         Assessment:       1. Narcotic dependence          Plan:   (+) pt taking medication as prescribed.    (+) dose is approproate.    (+) urine drug screen done.   (+) discussed risks of buprenorphine, including to others. "     (+) assessed if benefits outweigh risks of buprenorphine. .     (+) reviewed safe storage of medication.     (+) discussed proper use of buprenorphine, including missed doses.          Narcotic dependence  -     buprenorphine-naloxone (SUBOXONE) 8-2 mg Film; 1 sl bid  Dispense: 60 packet; Refill: 0      Follow up in about 1 month (around 1/12/2020).

## 2020-01-13 ENCOUNTER — PATIENT MESSAGE (OUTPATIENT)
Dept: FAMILY MEDICINE | Facility: CLINIC | Age: 35
End: 2020-01-13

## 2020-01-15 ENCOUNTER — TELEPHONE (OUTPATIENT)
Dept: FAMILY MEDICINE | Facility: CLINIC | Age: 35
End: 2020-01-15

## 2020-01-15 ENCOUNTER — OFFICE VISIT (OUTPATIENT)
Dept: FAMILY MEDICINE | Facility: CLINIC | Age: 35
End: 2020-01-15
Payer: COMMERCIAL

## 2020-01-15 VITALS
OXYGEN SATURATION: 96 % | TEMPERATURE: 98 F | HEIGHT: 71 IN | BODY MASS INDEX: 28.95 KG/M2 | WEIGHT: 206.81 LBS | RESPIRATION RATE: 16 BRPM | SYSTOLIC BLOOD PRESSURE: 136 MMHG | DIASTOLIC BLOOD PRESSURE: 82 MMHG | HEART RATE: 76 BPM

## 2020-01-15 DIAGNOSIS — F11.20 NARCOTIC DEPENDENCE: ICD-10-CM

## 2020-01-15 PROCEDURE — 80305 DRUG TEST PRSMV DIR OPT OBS: CPT | Mod: QW,S$GLB,, | Performed by: INTERNAL MEDICINE

## 2020-01-15 PROCEDURE — 99213 PR OFFICE/OUTPT VISIT, EST, LEVL III, 20-29 MIN: ICD-10-PCS | Mod: S$GLB,,, | Performed by: INTERNAL MEDICINE

## 2020-01-15 PROCEDURE — 80305 POCT BUP URINE DRUG TEST: ICD-10-PCS | Mod: QW,S$GLB,, | Performed by: INTERNAL MEDICINE

## 2020-01-15 PROCEDURE — 99213 OFFICE O/P EST LOW 20 MIN: CPT | Mod: S$GLB,,, | Performed by: INTERNAL MEDICINE

## 2020-01-15 PROCEDURE — 3008F BODY MASS INDEX DOCD: CPT | Mod: S$GLB,,, | Performed by: INTERNAL MEDICINE

## 2020-01-15 PROCEDURE — 3008F PR BODY MASS INDEX (BMI) DOCUMENTED: ICD-10-PCS | Mod: S$GLB,,, | Performed by: INTERNAL MEDICINE

## 2020-01-15 RX ORDER — BUPRENORPHINE AND NALOXONE 8; 2 MG/1; MG/1
FILM, SOLUBLE BUCCAL; SUBLINGUAL
Qty: 60 PACKET | Refills: 0 | Status: SHIPPED | OUTPATIENT
Start: 2020-01-15 | End: 2020-03-06 | Stop reason: SDUPTHER

## 2020-01-15 NOTE — PROGRESS NOTES
"Subjective:       Patient ID: Vargas Lozano is a 34 y.o. male.    Chief Complaint: narcotic dependence    HPI     HPI   The patient presents for medical management of opioid dependency. he is receiving maintenance therapy with buprenorphine.      CHIEF COMPLAINT: opoid dependence  HPI:     ONSET/TIMING:     DURATION: . Continuous(+).    QUALITY/COURSE:  unchanged.     INTENSITY/SEVERITY:  controlled.     CONTEXT/WHEN:     MODIFIERS/TREATMENTS:  Taking medications(+) Suboxone  8/2 # 60,   last rx given 12.12.19    SYMPTOMS/RELATED: no withdrawal symptoms. no cravings . No substance abuse.  No alcohol use     pnp checked: last month:  Yes              Review of Systems   Constitutional: Negative for diaphoresis, fatigue and unexpected weight change.   Gastrointestinal: Negative for constipation, diarrhea, nausea and vomiting.   Neurological: Negative for dizziness, light-headedness and headaches.   Psychiatric/Behavioral: Negative for dysphoric mood and sleep disturbance. The patient is not nervous/anxious.          Objective:      Vitals:    01/15/20 1450   BP: 136/82   Pulse: 76   Resp: 16   Temp: 98.2 °F (36.8 °C)   TempSrc: Oral   SpO2: 96%   Weight: 93.8 kg (206 lb 12.7 oz)   Height: 5' 11" (1.803 m)   PainSc: 0-No pain     Physical Exam   Constitutional: He appears well-developed and well-nourished.   Cardiovascular: Normal rate, regular rhythm and normal heart sounds.   Pulmonary/Chest: Effort normal and breath sounds normal.   Abdominal: Soft. There is no tenderness.   Neurological: He is alert.   Psychiatric: He has a normal mood and affect. His behavior is normal. Thought content normal.   Nursing note and vitals reviewed.            Assessment:       1. Narcotic dependence          Plan:   (+) pt taking medication as prescribed.    (+) dose is approproate.    (+) urine drug screen done.   (+) discussed risks of buprenorphine, including to others.     (+) assessed if benefits outweigh risks of " buprenorphine. .     (+) reviewed safe storage of medication.     (+) discussed proper use of buprenorphine, including missed doses.            Narcotic dependence  -     buprenorphine-naloxone (SUBOXONE) 8-2 mg Film; 1 sl bid  Dispense: 60 packet; Refill: 0      Follow up in about 1 month (around 2/15/2020).

## 2020-02-17 ENCOUNTER — TELEPHONE (OUTPATIENT)
Dept: FAMILY MEDICINE | Facility: CLINIC | Age: 35
End: 2020-02-17

## 2020-03-02 ENCOUNTER — PATIENT MESSAGE (OUTPATIENT)
Dept: FAMILY MEDICINE | Facility: CLINIC | Age: 35
End: 2020-03-02

## 2020-03-06 ENCOUNTER — OFFICE VISIT (OUTPATIENT)
Dept: FAMILY MEDICINE | Facility: CLINIC | Age: 35
End: 2020-03-06
Payer: COMMERCIAL

## 2020-03-06 VITALS
DIASTOLIC BLOOD PRESSURE: 76 MMHG | HEART RATE: 85 BPM | RESPIRATION RATE: 16 BRPM | SYSTOLIC BLOOD PRESSURE: 130 MMHG | BODY MASS INDEX: 28.89 KG/M2 | HEIGHT: 71 IN | TEMPERATURE: 98 F | OXYGEN SATURATION: 98 % | WEIGHT: 206.38 LBS

## 2020-03-06 DIAGNOSIS — F11.20 NARCOTIC DEPENDENCE: ICD-10-CM

## 2020-03-06 PROCEDURE — 3008F PR BODY MASS INDEX (BMI) DOCUMENTED: ICD-10-PCS | Mod: S$GLB,,, | Performed by: INTERNAL MEDICINE

## 2020-03-06 PROCEDURE — 3008F BODY MASS INDEX DOCD: CPT | Mod: S$GLB,,, | Performed by: INTERNAL MEDICINE

## 2020-03-06 PROCEDURE — 99213 PR OFFICE/OUTPT VISIT, EST, LEVL III, 20-29 MIN: ICD-10-PCS | Mod: S$GLB,,, | Performed by: INTERNAL MEDICINE

## 2020-03-06 PROCEDURE — 99213 OFFICE O/P EST LOW 20 MIN: CPT | Mod: S$GLB,,, | Performed by: INTERNAL MEDICINE

## 2020-03-06 RX ORDER — BUPRENORPHINE AND NALOXONE 8; 2 MG/1; MG/1
FILM, SOLUBLE BUCCAL; SUBLINGUAL
Qty: 40 PACKET | Refills: 0 | Status: SHIPPED | OUTPATIENT
Start: 2020-03-06

## 2020-03-06 NOTE — PROGRESS NOTES
"Subjective:       Patient ID: Vargas Lozano is a 34 y.o. male.    Chief Complaint: narcotic dependence    HPI     HPI   The patient presents for medical management of opioid dependency. he is receiving maintenance therapy with buprenorphine.      CHIEF COMPLAINT: opoid dependence  HPI:     ONSET/TIMING:     DURATION: . Continuous(+).    QUALITY/COURSE:  unchanged.     INTENSITY/SEVERITY:  controlled.     CONTEXT/WHEN:     MODIFIERS/TREATMENTS:  Taking medications(+) Suboxone  8/2 # 60,   last rx given 1.15.20    SYMPTOMS/RELATED: no withdrawal symptoms. no cravings . No substance abuse.  No alcohol use     pnp checked: last month:  Yes              Review of Systems   Constitutional: Negative for diaphoresis, fatigue and unexpected weight change.   Gastrointestinal: Negative for constipation, diarrhea, nausea and vomiting.   Neurological: Negative for dizziness, light-headedness and headaches.   Psychiatric/Behavioral: Negative for dysphoric mood and sleep disturbance. The patient is not nervous/anxious.          Objective:      Vitals:    03/06/20 1407   BP: 130/76   Pulse: 85   Resp: 16   Temp: 98 °F (36.7 °C)   TempSrc: Oral   SpO2: 98%   Weight: 93.6 kg (206 lb 5.6 oz)   Height: 5' 11" (1.803 m)   PainSc: 0-No pain     Physical Exam   Constitutional: He appears well-developed and well-nourished.   Cardiovascular: Normal rate, regular rhythm and normal heart sounds.   Pulmonary/Chest: Effort normal and breath sounds normal.   Abdominal: Soft. There is no tenderness.   Neurological: He is alert.   Psychiatric: He has a normal mood and affect. His behavior is normal. Thought content normal.   Nursing note and vitals reviewed.            Assessment:       1. Narcotic dependence          Plan:   (+) pt taking medication as prescribed.    (+) dose is approproate.    (-) urine drug screen done. Will give pt 2/3 of rx for suboxone.     (+) discussed risks of buprenorphine, including to others.     (+) assessed if " benefits outweigh risks of buprenorphine. .     (+) reviewed safe storage of medication.     (+) discussed proper use of buprenorphine, including missed doses.             Narcotic dependence  -     buprenorphine-naloxone (SUBOXONE) 8-2 mg Film; 1 sl bid  Dispense: 40 packet; Refill: 0  -     Misc Sendout Test, Non-Blood 10 Panel drug screen plus buphenorphine; Future      Follow up in about 1 month (around 4/6/2020).

## 2020-03-11 ENCOUNTER — TELEPHONE (OUTPATIENT)
Dept: FAMILY MEDICINE | Facility: CLINIC | Age: 35
End: 2020-03-11

## 2021-04-05 ENCOUNTER — PATIENT MESSAGE (OUTPATIENT)
Dept: ADMINISTRATIVE | Facility: HOSPITAL | Age: 36
End: 2021-04-05

## 2021-05-14 NOTE — TELEPHONE ENCOUNTER
----- Message from Susan Hilliard sent at 7/28/2017  2:11 PM CDT -----  Contact: Patient  Patient just wants to  the partial prescription for the Suboxone. Please call patient at [phone]. Patient called earlier just checking.   No

## 2021-07-06 ENCOUNTER — PATIENT MESSAGE (OUTPATIENT)
Dept: ADMINISTRATIVE | Facility: HOSPITAL | Age: 36
End: 2021-07-06

## 2024-02-16 NOTE — TELEPHONE ENCOUNTER
----- Message from Barbara Mauricio sent at 7/28/2017 11:58 AM CDT -----  Contact: self  Patient [phone] is calling to check the status of having the rest of his medication filled from last month due to the fact that the office ran out of drug screening   no

## 2025-01-29 NOTE — PATIENT INSTRUCTIONS
An order for a urine drug screen with suboxone was given.  The patient is to use the order when called, on a random day.      No